# Patient Record
Sex: FEMALE | Race: BLACK OR AFRICAN AMERICAN | NOT HISPANIC OR LATINO | Employment: STUDENT | URBAN - METROPOLITAN AREA
[De-identification: names, ages, dates, MRNs, and addresses within clinical notes are randomized per-mention and may not be internally consistent; named-entity substitution may affect disease eponyms.]

---

## 2018-01-13 NOTE — PROGRESS NOTES
Assessment    1  Well adult on routine health check (V70 0) (Z00 00)    Plan  Well adult on routine health check    · Follow-up visit in 1 year Evaluation and Treatment  Follow-up  Status: Hold For -  Scheduling  Requested for: [id number]   · Always use a seat belt and shoulder strap when riding or driving a motor vehicle ;  Status:Complete;   Done: 12YHN2633   · Begin a limited exercise program ; Status:Complete;   Done: 98VCO6146   · Decreasing the stress in your life may help your condition improve ; Status:Complete;    Done: 27ORZ0456   · There are ways to decrease your stress and improve your sense of well-being  We  encourage you to keep active and exercise regularly  Make time to take care of yourself  and participate in activities that you enjoy  Stay connected to friends and family that can  support and comfort you  If at any time you have thoughts of harming yourself or  someone else, contact us immediately ; Status:Active; Requested VQ:[id number];    · Use a sun block product with an SPF of 15 or more ; Status:Complete;   Done:  67FRZ4080   · Use appropriate protective gear for your sport or work ; Status:Complete;   Done:  71MXP2002   · We recommend routine visits to a dentist ; Status:Complete;   Done: 58RVL6693   · When and how to use a seat belt for a child ; Status:Complete;   Done: 24PZN8808   · Call [phone] if: You find a new or different kind of lump in your breast ;  Status:Complete;   Done: 24UMK1383    Discussion/Summary    Impression:   No growth concerns  Anticipatory guidance addressed as per the history of present illness section  No vaccines needed  She is not on any medications  Information discussed with patient       Cleared for sports participation, form signed , review family history with parents, encouraged healthy diet , reg exercise, safety from injury, reassurance provided, return 1 year for physical    Possible side effects of new medications were reviewed with the patient/guardian today  The patient was counseled regarding diagnostic results, instructions for management, risk factor reductions, prognosis, patient and family education, impressions, risks and benefits of treatment options, importance of compliance with treatment  Chief Complaint  Sports Physical      History of Present Illness  HM, 12-18 years Female (Brief):   General Health: The child's health since the last visit is described as good   no illness since last visit  Dental hygiene: Good  Immunization status: Up to date  Caregiver concerns:   Caregivers deny concerns regarding nutrition  Menstrual status: The patient is menarcheal    Nutrition/Elimination:   Diet:  her current diet is diverse and healthy  No elimination issues are expressed  Sleep:  No sleep issues are reported  Behavior: The child's temperament is described as calm  No behavior issues identified  Health Risks:   Childcare/School: She is in high school  School performance has been excellent  Sports Participation Questions: (history reviewed no identifiable risks )   HPI: sports clearance physical , track and field, feels well, had participated in past, feels well no issues no recent illness injury or change in health      Review of Systems    Constitutional: No complaints of fever or chills, feels well, no tiredness, no recent weight gain or loss  Eyes: No complaints of eye pain, no discharge, no eyesight problems, eyes do not itch, no red or dry eyes  ENT: no complaints of nasal discharge, no hoarseness, no earache, no nosebleeds, no loss of hearing, no sore throat  Cardiovascular: No complaints of chest pain, no palpitations, normal heart rate, no lower extremity edema  Respiratory: No complaints of cough, no shortness of breath, no wheezing, no leg claudication  Gastrointestinal: No complaints of abdominal pain, no nausea or vomiting, no constipation, no diarrhea or bloody stools     Genitourinary: No complaints of incontinence, no pelvic pain, no dysuria or dysmenorrhea, no abnormal vaginal bleeding or vaginal discharge  Musculoskeletal: No complaints of limb swelling or limb pain, no myalgias, no joint swelling or joint stiffness  Integumentary: No complaints of skin rash, no skin lesions or wounds, no itching, no breast pain, no breast lump  Neurological: No complaints of headache, no numbness or tingling, no confusion, no dizziness, no limb weakness, no convulsions or fainting, no difficulty walking  Psychiatric: No complaints of feeling depressed, no suicidal thoughts, no emotional problems, no anxiety, no sleep disturbances, no change in personality  Endocrine: No complaints of feeling weak, no muscle weakness, no deepening of voice, no hot flashes or proptosis  Hematologic/Lymphatic: No complaints of swollen glands, no neck swollen glands, does not bleed or bruise easily  ROS reported by the patient  Active Problems    1  Acne (706 1) (L70 9)    Past Medical History    · History of broken finger (V15 51) (Z87 81)    Surgical History    · History of Open Treatment Of Fracture Of One Finger Joint    Family History    · Family history of Diabetes   · Family history of lung cancer (V16 1) (Z80 1)   · Family history of Heart disease    Social History    · Denied: History of Alcohol use   · Never a smoker    Current Meds   1  Vitamin A CAPS; 8000 iu Take one tab daily; Therapy: (Recorded:28Nov2014) to Recorded   2  Zinc CAPS; take 1 capsule daily; Therapy: (Recorded:28Nov2014) to Recorded    Allergies    1  No Known Drug Allergies    2   No Known Environmental Allergies    Vitals   Recorded: [id number] 01:57PM   Temperature 98 5 F   Heart Rate 58   Respiration 18   Systolic 613   Diastolic 80   Height 5 ft 4 5 in   2-20 Stature Percentile 54 %   Weight 133 lb 6 oz   2-20 Weight Percentile 66 %   BMI Calculated 22 54   BMI Percentile 64 %   BSA Calculated 1 66   O2 Saturation 99     Physical Exam    Constitutional - General appearance: No acute distress, well appearing and well nourished  Head and Face - Head and face: Normocephalic, atraumatic  Palpation of the face and sinuses: Normal, no sinus tenderness  Eyes - Conjunctiva and lids: No injection, edema or discharge  Pupils and irises: Equal, round, reactive to light bilaterally  Ears, Nose, Mouth, and Throat - External inspection of ears and nose: Normal without deformities or discharge  Otoscopic examination: Tympanic membranes gray, translucent with good bony landmarks and light reflex  Canals patent without erythema  Hearing: Normal  Nasal mucosa, septum, and turbinates: Normal, no edema or discharge  Lips, teeth, and gums: Normal, good dentition  Oropharynx: Moist mucosa, normal tongue and tonsils without lesions  Neck - Neck: Supple, symmetric, no masses  Thyroid: No thyromegaly  Pulmonary - Respiratory effort: Normal respiratory rate and rhythm, no increased work of breathing  Auscultation of lungs: Clear bilaterally  Cardiovascular - Auscultation of heart: Regular rate and rhythm, normal S1 and S2, no murmur  Carotid pulses: Normal, 2+ bilaterally  Abdominal aorta: Normal  Femoral pulses: Normal, 2+ bilaterally  Pedal pulses: Normal, 2+ bilaterally  Examination of extremities for edema and/or varicosities: Normal    Abdomen - Abdomen: Normal bowel sounds, soft, non-tender, no masses  Liver and spleen: No hepatomegaly or splenomegaly  Lymphatic - Palpation of lymph nodes in neck: No anterior or posterior cervical lymphadenopathy  Musculoskeletal - Gait and station: Normal gait  Digits and nails: Normal without clubbing or cyanosis  Inspection/palpation of joints, bones, and muscles: Normal  Evaluation for scoliosis: No scoliosis on exam  Range of motion: Normal  Stability: No joint instability   Muscle strength/tone: Normal    Skin - Skin and subcutaneous tissue: Abnormal  Clinical impression: acne (on the entire forehead, on the face and on both cheeks ) and comedones (on both cheeks )  Examination of the skin for lesions: Abnormal  Palpation of skin and subcutaneous tissue: Abnormal    Neurologic - Cranial nerves: Normal  Reflexes: Normal  Sensation: Normal  Coordination: Normal    Psychiatric - judgment and insight: Normal  Orientation to person, place, and time: Normal  Mood and affect: Normal       Procedure    Procedure:   Results: 20/13 in both eyes without corrective device, 20/13 in the right eye without corrective device, 20/13 in the left eye without corrective device      Signatures   Electronically signed by : SUNIL Angeles; Jan 17 2016  8:26PM EST                       (Author)    Electronically signed by :  Micah Esquivel DO; Jan 24 2016  8:01PM EST

## 2018-01-15 NOTE — RESULT NOTES
Verified Results  [facility]) Measles/Mumps/Rubella Immunity [id number] 08:31AM Eva Hayden     Test Name Result Flag Reference   Rubella Antibodies, IgG 2 23 index  Immune >0 99   Non-immune       <0 90                                                 Equivocal  0 90 - 0 99                                                 Immune           >0 99   Rubeola Ab, IgG 113 0 AU/mL  Immune >29 9   Negative        <25 0                                                  Equivocal 25 0 - 29 9                                                  Positive        >29 9                 Presence of antibodies to Rubeola is presumptive evidence                 of immunity except when acute infection is suspected  Mumps Abs, IgG 130 0 AU/mL  Immune >10 9   Negative         <9 0                                                 Equivocal  9 0 - 10 9                                                 Positive        >10 9                 A positive result generally indicates past exposure to                 Mumps virus or previous vaccination  ([facility]) Hepatitis B Virus (Profile VI) [id number] 08:31AM Eva Hayden     Test Name Result Flag Reference   HBsAg Screen Negative  Negative   Hep Be Ag Negative  Negative   Hep B Core Ab, IgM Negative  Negative   Hep B Core Ab, Tot Negative  Negative   Hep Be Ab Negative  Negative   Hep B Surface Ab, Qual Reactive     Non Reactive: Inconsistent with immunity,                                             less than 10 mIU/mL                               Reactive:     Consistent with immunity,                                             greater than 9 9 mIU/mL       Discussion/Summary   Your blood work is consistent with immunity to Onovative, mumps, rubella and hepatitis B  Follow up in the office as needed    RL

## 2020-06-08 ENCOUNTER — HOSPITAL ENCOUNTER (EMERGENCY)
Facility: HOSPITAL | Age: 20
End: 2020-06-10
Attending: EMERGENCY MEDICINE | Admitting: EMERGENCY MEDICINE
Payer: COMMERCIAL

## 2020-06-08 DIAGNOSIS — F31.13 BIPOLAR DISORDER WITH SEVERE MANIA (HCC): ICD-10-CM

## 2020-06-08 DIAGNOSIS — F23 ACUTE PSYCHOSIS (HCC): Primary | ICD-10-CM

## 2020-06-08 LAB
ALBUMIN SERPL BCP-MCNC: 4.6 G/DL (ref 3.5–5)
ALP SERPL-CCNC: 51 U/L (ref 46–116)
ALT SERPL W P-5'-P-CCNC: 19 U/L (ref 12–78)
ANION GAP SERPL CALCULATED.3IONS-SCNC: 11 MMOL/L (ref 4–13)
AST SERPL W P-5'-P-CCNC: 18 U/L (ref 5–45)
BASOPHILS # BLD MANUAL: 0 THOUSAND/UL (ref 0–0.1)
BASOPHILS NFR MAR MANUAL: 0 % (ref 0–1)
BILIRUB SERPL-MCNC: 1 MG/DL (ref 0.2–1)
BUN SERPL-MCNC: 15 MG/DL (ref 5–25)
CALCIUM SERPL-MCNC: 9.5 MG/DL (ref 8.3–10.1)
CHLORIDE SERPL-SCNC: 101 MMOL/L (ref 100–108)
CO2 SERPL-SCNC: 24 MMOL/L (ref 21–32)
CREAT SERPL-MCNC: 1.05 MG/DL (ref 0.6–1.3)
EOSINOPHIL # BLD MANUAL: 0 THOUSAND/UL (ref 0–0.4)
EOSINOPHIL NFR BLD MANUAL: 0 % (ref 0–6)
ERYTHROCYTE [DISTWIDTH] IN BLOOD BY AUTOMATED COUNT: 13.9 % (ref 11.6–15.1)
ETHANOL SERPL-MCNC: <3 MG/DL (ref 0–3)
GFR SERPL CREATININE-BSD FRML MDRD: 87 ML/MIN/1.73SQ M
GLUCOSE SERPL-MCNC: 122 MG/DL (ref 65–140)
HCT VFR BLD AUTO: 38.8 % (ref 34.8–46.1)
HGB BLD-MCNC: 14 G/DL (ref 11.5–15.4)
LYMPHOCYTES # BLD AUTO: 4.04 THOUSAND/UL (ref 0.6–4.47)
LYMPHOCYTES # BLD AUTO: 47 % (ref 14–44)
MCH RBC QN AUTO: 27.6 PG (ref 26.8–34.3)
MCHC RBC AUTO-ENTMCNC: 36.1 G/DL (ref 31.4–37.4)
MCV RBC AUTO: 76 FL (ref 82–98)
MONOCYTES # BLD AUTO: 1.03 THOUSAND/UL (ref 0–1.22)
MONOCYTES NFR BLD: 12 % (ref 4–12)
NEUTROPHILS # BLD MANUAL: 3.52 THOUSAND/UL (ref 1.85–7.62)
NEUTS SEG NFR BLD AUTO: 41 % (ref 43–75)
PLATELET # BLD AUTO: 281 THOUSANDS/UL (ref 149–390)
PLATELET BLD QL SMEAR: ADEQUATE
PMV BLD AUTO: 9.9 FL (ref 8.9–12.7)
POTASSIUM SERPL-SCNC: 3.3 MMOL/L (ref 3.5–5.3)
PROT SERPL-MCNC: 8.8 G/DL (ref 6.4–8.2)
RBC # BLD AUTO: 5.08 MILLION/UL (ref 3.81–5.12)
RBC MORPH BLD: NORMAL
SODIUM SERPL-SCNC: 136 MMOL/L (ref 136–145)
TOTAL CELLS COUNTED SPEC: 100
WBC # BLD AUTO: 8.59 THOUSAND/UL (ref 4.31–10.16)

## 2020-06-08 PROCEDURE — 85007 BL SMEAR W/DIFF WBC COUNT: CPT | Performed by: EMERGENCY MEDICINE

## 2020-06-08 PROCEDURE — 85027 COMPLETE CBC AUTOMATED: CPT | Performed by: EMERGENCY MEDICINE

## 2020-06-08 PROCEDURE — 96372 THER/PROPH/DIAG INJ SC/IM: CPT

## 2020-06-08 PROCEDURE — 99285 EMERGENCY DEPT VISIT HI MDM: CPT

## 2020-06-08 PROCEDURE — 99285 EMERGENCY DEPT VISIT HI MDM: CPT | Performed by: EMERGENCY MEDICINE

## 2020-06-08 PROCEDURE — 80320 DRUG SCREEN QUANTALCOHOLS: CPT | Performed by: EMERGENCY MEDICINE

## 2020-06-08 PROCEDURE — 81025 URINE PREGNANCY TEST: CPT | Performed by: EMERGENCY MEDICINE

## 2020-06-08 PROCEDURE — 80053 COMPREHEN METABOLIC PANEL: CPT | Performed by: EMERGENCY MEDICINE

## 2020-06-08 PROCEDURE — 36415 COLL VENOUS BLD VENIPUNCTURE: CPT | Performed by: EMERGENCY MEDICINE

## 2020-06-08 RX ORDER — LORAZEPAM 2 MG/ML
1 INJECTION INTRAMUSCULAR ONCE
Status: COMPLETED | OUTPATIENT
Start: 2020-06-08 | End: 2020-06-08

## 2020-06-08 RX ADMIN — LORAZEPAM 1 MG: 2 INJECTION INTRAMUSCULAR; INTRAVENOUS at 23:36

## 2020-06-09 LAB
AMPHETAMINES SERPL QL SCN: NEGATIVE
BACTERIA UR QL AUTO: ABNORMAL /HPF
BARBITURATES UR QL: NEGATIVE
BENZODIAZ UR QL: NEGATIVE
BILIRUB UR QL STRIP: NEGATIVE
CLARITY UR: ABNORMAL
COCAINE UR QL: NEGATIVE
COLOR UR: YELLOW
EXT PREG TEST URINE: NEGATIVE
EXT. CONTROL ED NAV: NORMAL
GLUCOSE UR STRIP-MCNC: NEGATIVE MG/DL
HGB UR QL STRIP.AUTO: NEGATIVE
KETONES UR STRIP-MCNC: ABNORMAL MG/DL
LEUKOCYTE ESTERASE UR QL STRIP: NEGATIVE
METHADONE UR QL: NEGATIVE
NITRITE UR QL STRIP: NEGATIVE
NON-SQ EPI CELLS URNS QL MICRO: ABNORMAL /HPF
OPIATES UR QL SCN: NEGATIVE
PCP UR QL: NEGATIVE
PH UR STRIP.AUTO: 5.5 [PH]
PROT UR STRIP-MCNC: ABNORMAL MG/DL
RBC #/AREA URNS AUTO: ABNORMAL /HPF
SP GR UR STRIP.AUTO: >=1.03 (ref 1–1.03)
THC UR QL: POSITIVE
UROBILINOGEN UR QL STRIP.AUTO: 0.2 E.U./DL
WBC #/AREA URNS AUTO: ABNORMAL /HPF

## 2020-06-09 PROCEDURE — 81001 URINALYSIS AUTO W/SCOPE: CPT | Performed by: EMERGENCY MEDICINE

## 2020-06-09 PROCEDURE — 96372 THER/PROPH/DIAG INJ SC/IM: CPT

## 2020-06-09 PROCEDURE — 80307 DRUG TEST PRSMV CHEM ANLYZR: CPT | Performed by: EMERGENCY MEDICINE

## 2020-06-09 RX ORDER — LORAZEPAM 2 MG/ML
1 INJECTION INTRAMUSCULAR ONCE
Status: COMPLETED | OUTPATIENT
Start: 2020-06-09 | End: 2020-06-09

## 2020-06-09 RX ORDER — OLANZAPINE 10 MG/1
10 INJECTION, POWDER, LYOPHILIZED, FOR SOLUTION INTRAMUSCULAR ONCE
Status: COMPLETED | OUTPATIENT
Start: 2020-06-09 | End: 2020-06-09

## 2020-06-09 RX ADMIN — OLANZAPINE 10 MG: 10 INJECTION, POWDER, FOR SOLUTION INTRAMUSCULAR at 07:36

## 2020-06-09 RX ADMIN — LORAZEPAM 1 MG: 2 INJECTION INTRAMUSCULAR; INTRAVENOUS at 07:36

## 2020-06-10 ENCOUNTER — APPOINTMENT (EMERGENCY)
Dept: RADIOLOGY | Facility: HOSPITAL | Age: 20
End: 2020-06-10
Payer: COMMERCIAL

## 2020-06-10 VITALS
OXYGEN SATURATION: 97 % | DIASTOLIC BLOOD PRESSURE: 99 MMHG | TEMPERATURE: 98.5 F | RESPIRATION RATE: 19 BRPM | HEART RATE: 100 BPM | SYSTOLIC BLOOD PRESSURE: 130 MMHG

## 2020-06-10 LAB — SARS-COV-2 RNA RESP QL NAA+PROBE: NEGATIVE

## 2020-06-10 PROCEDURE — 71045 X-RAY EXAM CHEST 1 VIEW: CPT

## 2020-06-10 PROCEDURE — 87635 SARS-COV-2 COVID-19 AMP PRB: CPT | Performed by: EMERGENCY MEDICINE

## 2020-06-10 PROCEDURE — 93005 ELECTROCARDIOGRAM TRACING: CPT

## 2020-06-11 LAB
ATRIAL RATE: 69 BPM
P AXIS: 71 DEGREES
PR INTERVAL: 154 MS
QRS AXIS: 87 DEGREES
QRSD INTERVAL: 82 MS
QT INTERVAL: 372 MS
QTC INTERVAL: 398 MS
T WAVE AXIS: 47 DEGREES
VENTRICULAR RATE: 69 BPM

## 2020-06-11 PROCEDURE — 93010 ELECTROCARDIOGRAM REPORT: CPT | Performed by: INTERNAL MEDICINE

## 2020-08-07 ENCOUNTER — HOSPITAL ENCOUNTER (INPATIENT)
Facility: HOSPITAL | Age: 20
LOS: 4 days | Discharge: LEFT BEFORE DISCHARGE | DRG: 558 | End: 2020-08-14
Attending: EMERGENCY MEDICINE | Admitting: INTERNAL MEDICINE
Payer: COMMERCIAL

## 2020-08-07 DIAGNOSIS — M62.82 RHABDOMYOLYSIS: Primary | ICD-10-CM

## 2020-08-07 DIAGNOSIS — F23 ACUTE PSYCHOSIS (HCC): ICD-10-CM

## 2020-08-07 DIAGNOSIS — F25.9 SCHIZOAFFECTIVE DISORDER, UNSPECIFIED TYPE (HCC): ICD-10-CM

## 2020-08-07 LAB
ALBUMIN SERPL BCP-MCNC: 3.8 G/DL (ref 3.5–5)
ALBUMIN SERPL BCP-MCNC: 3.9 G/DL (ref 3.5–5)
ALP SERPL-CCNC: 44 U/L (ref 46–116)
ALP SERPL-CCNC: 46 U/L (ref 46–116)
ALT SERPL W P-5'-P-CCNC: 21 U/L (ref 12–78)
ALT SERPL W P-5'-P-CCNC: 26 U/L (ref 12–78)
ANION GAP SERPL CALCULATED.3IONS-SCNC: 17 MMOL/L (ref 4–13)
ANION GAP SERPL CALCULATED.3IONS-SCNC: 8 MMOL/L (ref 4–13)
APAP SERPL-MCNC: <2 UG/ML (ref 10–20)
AST SERPL W P-5'-P-CCNC: 21 U/L (ref 5–45)
AST SERPL W P-5'-P-CCNC: 30 U/L (ref 5–45)
ATRIAL RATE: 84 BPM
ATRIAL RATE: 89 BPM
BASOPHILS # BLD AUTO: 0.05 THOUSANDS/ΜL (ref 0–0.1)
BASOPHILS NFR BLD AUTO: 1 % (ref 0–1)
BILIRUB SERPL-MCNC: 0.6 MG/DL (ref 0.2–1)
BILIRUB SERPL-MCNC: 0.8 MG/DL (ref 0.2–1)
BUN SERPL-MCNC: 10 MG/DL (ref 5–25)
BUN SERPL-MCNC: 10 MG/DL (ref 5–25)
CALCIUM SERPL-MCNC: 8.8 MG/DL (ref 8.3–10.1)
CALCIUM SERPL-MCNC: 9.2 MG/DL (ref 8.3–10.1)
CHLORIDE SERPL-SCNC: 102 MMOL/L (ref 100–108)
CHLORIDE SERPL-SCNC: 105 MMOL/L (ref 100–108)
CK MB SERPL-MCNC: 0.9 NG/ML (ref 0–5)
CK MB SERPL-MCNC: <1 % (ref 0–2.5)
CK SERPL-CCNC: 307 U/L (ref 26–192)
CO2 SERPL-SCNC: 19 MMOL/L (ref 21–32)
CO2 SERPL-SCNC: 27 MMOL/L (ref 21–32)
CREAT SERPL-MCNC: 0.89 MG/DL (ref 0.6–1.3)
CREAT SERPL-MCNC: 1.36 MG/DL (ref 0.6–1.3)
EOSINOPHIL # BLD AUTO: 0 THOUSAND/ΜL (ref 0–0.61)
EOSINOPHIL NFR BLD AUTO: 0 % (ref 0–6)
ERYTHROCYTE [DISTWIDTH] IN BLOOD BY AUTOMATED COUNT: 13.5 % (ref 11.6–15.1)
ETHANOL SERPL-MCNC: <3 MG/DL (ref 0–3)
GFR SERPL CREATININE-BSD FRML MDRD: 106 ML/MIN/1.73SQ M
GFR SERPL CREATININE-BSD FRML MDRD: 64 ML/MIN/1.73SQ M
GLUCOSE SERPL-MCNC: 170 MG/DL (ref 65–140)
GLUCOSE SERPL-MCNC: 89 MG/DL (ref 65–140)
HCT VFR BLD AUTO: 36 % (ref 34.8–46.1)
HGB BLD-MCNC: 12.7 G/DL (ref 11.5–15.4)
IMM GRANULOCYTES # BLD AUTO: 0.04 THOUSAND/UL (ref 0–0.2)
IMM GRANULOCYTES NFR BLD AUTO: 0 % (ref 0–2)
LYMPHOCYTES # BLD AUTO: 0.83 THOUSANDS/ΜL (ref 0.6–4.47)
LYMPHOCYTES NFR BLD AUTO: 8 % (ref 14–44)
MCH RBC QN AUTO: 28.1 PG (ref 26.8–34.3)
MCHC RBC AUTO-ENTMCNC: 35.3 G/DL (ref 31.4–37.4)
MCV RBC AUTO: 80 FL (ref 82–98)
MONOCYTES # BLD AUTO: 0.61 THOUSAND/ΜL (ref 0.17–1.22)
MONOCYTES NFR BLD AUTO: 6 % (ref 4–12)
NEUTROPHILS # BLD AUTO: 9.12 THOUSANDS/ΜL (ref 1.85–7.62)
NEUTS SEG NFR BLD AUTO: 85 % (ref 43–75)
NRBC BLD AUTO-RTO: 0 /100 WBCS
P AXIS: 110 DEGREES
P AXIS: 78 DEGREES
PLATELET # BLD AUTO: 277 THOUSANDS/UL (ref 149–390)
PMV BLD AUTO: 10.1 FL (ref 8.9–12.7)
POTASSIUM SERPL-SCNC: 3.5 MMOL/L (ref 3.5–5.3)
POTASSIUM SERPL-SCNC: 4 MMOL/L (ref 3.5–5.3)
PR INTERVAL: 176 MS
PR INTERVAL: 178 MS
PROT SERPL-MCNC: 7.4 G/DL (ref 6.4–8.2)
PROT SERPL-MCNC: 7.9 G/DL (ref 6.4–8.2)
QRS AXIS: 104 DEGREES
QRS AXIS: 84 DEGREES
QRSD INTERVAL: 80 MS
QRSD INTERVAL: 80 MS
QT INTERVAL: 352 MS
QT INTERVAL: 356 MS
QTC INTERVAL: 420 MS
QTC INTERVAL: 428 MS
RBC # BLD AUTO: 4.52 MILLION/UL (ref 3.81–5.12)
SALICYLATES SERPL-MCNC: <3 MG/DL (ref 3–20)
SARS-COV-2 RNA RESP QL NAA+PROBE: NEGATIVE
SODIUM SERPL-SCNC: 138 MMOL/L (ref 136–145)
SODIUM SERPL-SCNC: 140 MMOL/L (ref 136–145)
T WAVE AXIS: 121 DEGREES
T WAVE AXIS: 57 DEGREES
VENTRICULAR RATE: 84 BPM
VENTRICULAR RATE: 89 BPM
WBC # BLD AUTO: 10.65 THOUSAND/UL (ref 4.31–10.16)

## 2020-08-07 PROCEDURE — 96372 THER/PROPH/DIAG INJ SC/IM: CPT

## 2020-08-07 PROCEDURE — 87635 SARS-COV-2 COVID-19 AMP PRB: CPT | Performed by: EMERGENCY MEDICINE

## 2020-08-07 PROCEDURE — 80329 ANALGESICS NON-OPIOID 1 OR 2: CPT | Performed by: EMERGENCY MEDICINE

## 2020-08-07 PROCEDURE — 85025 COMPLETE CBC W/AUTO DIFF WBC: CPT | Performed by: EMERGENCY MEDICINE

## 2020-08-07 PROCEDURE — 93005 ELECTROCARDIOGRAM TRACING: CPT

## 2020-08-07 PROCEDURE — 36415 COLL VENOUS BLD VENIPUNCTURE: CPT | Performed by: EMERGENCY MEDICINE

## 2020-08-07 PROCEDURE — 81025 URINE PREGNANCY TEST: CPT | Performed by: EMERGENCY MEDICINE

## 2020-08-07 PROCEDURE — 80053 COMPREHEN METABOLIC PANEL: CPT | Performed by: EMERGENCY MEDICINE

## 2020-08-07 PROCEDURE — 99285 EMERGENCY DEPT VISIT HI MDM: CPT

## 2020-08-07 PROCEDURE — 82553 CREATINE MB FRACTION: CPT | Performed by: EMERGENCY MEDICINE

## 2020-08-07 PROCEDURE — 82550 ASSAY OF CK (CPK): CPT | Performed by: EMERGENCY MEDICINE

## 2020-08-07 PROCEDURE — 99285 EMERGENCY DEPT VISIT HI MDM: CPT | Performed by: EMERGENCY MEDICINE

## 2020-08-07 PROCEDURE — 93010 ELECTROCARDIOGRAM REPORT: CPT | Performed by: INTERNAL MEDICINE

## 2020-08-07 PROCEDURE — 80320 DRUG SCREEN QUANTALCOHOLS: CPT | Performed by: EMERGENCY MEDICINE

## 2020-08-07 RX ORDER — LORAZEPAM 2 MG/ML
2 INJECTION INTRAMUSCULAR ONCE
Status: COMPLETED | OUTPATIENT
Start: 2020-08-07 | End: 2020-08-07

## 2020-08-07 RX ORDER — DIPHENHYDRAMINE HYDROCHLORIDE 50 MG/ML
50 INJECTION INTRAMUSCULAR; INTRAVENOUS ONCE
Status: COMPLETED | OUTPATIENT
Start: 2020-08-07 | End: 2020-08-07

## 2020-08-07 RX ORDER — OLANZAPINE 10 MG/1
10 INJECTION, POWDER, LYOPHILIZED, FOR SOLUTION INTRAMUSCULAR ONCE
Status: COMPLETED | OUTPATIENT
Start: 2020-08-07 | End: 2020-08-07

## 2020-08-07 RX ADMIN — OLANZAPINE 10 MG: 10 INJECTION, POWDER, FOR SOLUTION INTRAMUSCULAR at 11:16

## 2020-08-07 RX ADMIN — DIPHENHYDRAMINE HYDROCHLORIDE 50 MG: 50 INJECTION, SOLUTION INTRAMUSCULAR; INTRAVENOUS at 11:13

## 2020-08-07 RX ADMIN — LORAZEPAM 2 MG: 2 INJECTION INTRAMUSCULAR; INTRAVENOUS at 11:14

## 2020-08-07 NOTE — ED NOTES
Pt arrives to ER screaming and combative, carried in by four police officers and immediately restrained to bed and medicated as per order       Lo Winkler RN  08/07/20 6634

## 2020-08-07 NOTE — ED PROVIDER NOTES
History  Chief Complaint   Patient presents with    Psychiatric Evaluation     Patient arrived via police for crisis     Patient brought in by police for psychiatric evaluation  Patient has a history of schizoaffective disorder and as per father has not been taking her medication  Family guidance did mobile to try voluntary admission for last 2 hours but were unable to  Patient carried into the ER by 4 police officers  Unable to get any history from the patient  Patient placed in 4 point locked restraints and medicated  History provided by:  Parent, police and medical records  History limited by:  Psychiatric disorder   used: No    Psychiatric Evaluation       None       Past Medical History:   Diagnosis Date    Murmur, cardiac     Psychiatric disorder        History reviewed  No pertinent surgical history  History reviewed  No pertinent family history  I have reviewed and agree with the history as documented  E-Cigarette/Vaping    E-Cigarette Use Never User      E-Cigarette/Vaping Substances     Social History     Tobacco Use    Smoking status: Current Some Day Smoker    Smokeless tobacco: Never Used   Substance Use Topics    Alcohol use: Yes     Comment: states drank alot tonight    Drug use: Not on file     Comment: sometimes uses mushrooms       Review of Systems   Unable to perform ROS: Psychiatric disorder       Physical Exam  Physical Exam  Vitals signs and nursing note reviewed  Constitutional:       Comments: Agitated and fighting with police   HENT:      Head: Atraumatic  Eyes:      Pupils: Pupils are equal, round, and reactive to light  Neck:      Musculoskeletal: Normal range of motion  Cardiovascular:      Rate and Rhythm: Regular rhythm  Tachycardia present  Pulses: Normal pulses  Pulmonary:      Effort: Pulmonary effort is normal  No respiratory distress  Breath sounds: Normal breath sounds  No wheezing or rhonchi     Abdominal:      General: Abdomen is flat  Bowel sounds are normal  There is no distension  Tenderness: There is no abdominal tenderness  There is no guarding  Musculoskeletal: Normal range of motion  Skin:     Capillary Refill: Capillary refill takes less than 2 seconds  Neurological:      General: No focal deficit present  Mental Status: She is alert  Psychiatric:      Comments: agitated         Vital Signs  ED Triage Vitals   Temp Pulse Resp BP SpO2   -- -- -- -- --      Temp src Heart Rate Source Patient Position - Orthostatic VS BP Location FiO2 (%)   -- -- -- -- --      Pain Score       --           There were no vitals filed for this visit  Visual Acuity      ED Medications  Medications   OLANZapine (ZyPREXA) IM injection 10 mg (10 mg Intramuscular Given 8/7/20 1116)   diphenhydrAMINE (BENADRYL) injection 50 mg (50 mg Intramuscular Given 8/7/20 1113)   LORazepam (ATIVAN) injection 2 mg (2 mg Intramuscular Given 8/7/20 1114)       Diagnostic Studies  Results Reviewed     Procedure Component Value Units Date/Time    CK Total with Reflex CKMB [[id number]] Collected:  08/07/20 1149    Lab Status: In process Specimen:  Blood from Arm, Right Updated:  08/07/20 1340    [facility]ov[facility]-19[facility] [[id number]]  (Normal) Collected:  08/07/20 1151    Lab Status:  Final result Specimen:  Nares from Nose Updated:  08/07/20 1251     SARS-CoV-2 Negative    Narrative: The specimen collection materials, transport medium, and/or testing methodology utilized in the production of these test results have been proven to be reliable in a limited validation with an abbreviated program under the Emergency Utilization Authorization provided by the FDA  Testing reported as "Presumptive positive" will be confirmed with secondary testing with a reference laboratory to ensure result accuracy    Clinical caution and judgement should be used with the interpretation of these results with consideration of the clinical impression and other laboratory testing  Testing reported as "Positive" or "Negative" has been proven to be accurate according to standard laboratory validation requirements  All testing is performed with control materials showing appropriate reactivity at standard intervals        Ethanol [[id number]]  (Normal) Collected:  08/07/20 1149    Lab Status:  Final result Specimen:  Blood from Arm, Right Updated:  08/07/20 1216     Ethanol Lvl <3 mg/dL     Comprehensive metabolic panel [[id number]]  (Abnormal) Collected:  08/07/20 1149    Lab Status:  Final result Specimen:  Blood from Arm, Right Updated:  08/07/20 1212     Sodium 138 mmol/L      Potassium 3 5 mmol/L      Chloride 102 mmol/L      CO2 19 mmol/L      ANION GAP 17 mmol/L      BUN 10 mg/dL      Creatinine 1 36 mg/dL      Glucose 170 mg/dL      Calcium 9 2 mg/dL      AST 21 U/L      ALT 21 U/L      Alkaline Phosphatase 46 U/L      Total Protein 7 9 g/dL      Albumin 3 9 g/dL      Total Bilirubin 0 60 mg/dL      eGFR 64 ml/min/1 73sq m     Narrative:       [place] guidelines for Chronic Kidney Disease (CKD):     Stage 1 with normal or high GFR (GFR > 90 mL/min/1 73 square meters)    Stage 2 Mild CKD (GFR = 60-89 mL/min/1 73 square meters)    Stage 3A Moderate CKD (GFR = 45-59 mL/min/1 73 square meters)    Stage 3B Moderate CKD (GFR = 30-44 mL/min/1 73 square meters)    Stage 4 Severe CKD (GFR = 15-29 mL/min/1 73 square meters)    Stage 5 End Stage CKD (GFR <15 mL/min/1 73 square meters)  Note: GFR calculation is accurate only with a steady state creatinine    CBC and differential [[id number]]  (Abnormal) Collected:  08/07/20 1149    Lab Status:  Final result Specimen:  Blood from Arm, Right Updated:  08/07/20 1158     WBC 10 65 Thousand/uL      RBC 4 52 Million/uL      Hemoglobin 12 7 g/dL      Hematocrit 36 0 %      MCV 80 fL      MCH 28 1 pg      MCHC 35 3 g/dL      RDW 13 5 %      MPV 10 1 fL      Platelets 604 Thousands/uL      nRBC 0 /100 WBCs      Neutrophils Relative 85 %      Immat GRANS % 0 %      Lymphocytes Relative 8 %      Monocytes Relative 6 %      Eosinophils Relative 0 %      Basophils Relative 1 %      Neutrophils Absolute 9 12 Thousands/µL      Immature Grans Absolute 0 04 Thousand/uL      Lymphocytes Absolute 0 83 Thousands/µL      Monocytes Absolute 0 61 Thousand/µL      Eosinophils Absolute 0 00 Thousand/µL      Basophils Absolute 0 05 Thousands/µL     UA (URINE) with reflex to Scope [[id number]]     Lab Status:  No result Specimen:  Urine     Rapid drug screen, urine [[id number]]     Lab Status:  No result Specimen:  Urine     POCT pregnancy, urine [[id number]]     Lab Status:  No result                  No orders to display              Procedures  Procedures         ED Course       US AUDIT      Most Recent Value   Initial Alcohol Screen: US AUDIT-C    1  How often do you have a drink containing alcohol?  0 Filed at: 08/07/2020 1112   2  How many drinks containing alcohol do you have on a typical day you are drinking? 0 Filed at: 08/07/2020 1112   3a  Male UNDER 65: How often do you have five or more drinks on one occasion? 0 Filed at: 08/07/2020 1112   3b  FEMALE Any Age, or MALE 65+: How often do you have 4 or more drinks on one occassion? 0 Filed at: 08/07/2020 1112   Audit-C Score  0 Filed at: 08/07/2020 1112                  YI/DAST-10      Most Recent Value   How many times in the past year have you    Used an illegal drug or used a prescription medication for non-medical reasons? Never Filed at: 08/07/2020 1112                                MDM  Number of Diagnoses or Management Options  Acute psychosis [facility]):   Rhabdomyolysis:   Diagnosis management comments: Pulse ox 100% on RA indicating adequate oxygenation      Patient signed out to next provider Dr Ayse Cody pending re-evalution          Amount and/or Complexity of Data Reviewed  Clinical lab tests: ordered and reviewed  Decide to obtain previous medical records or to obtain history from someone other than the patient: yes  Review and summarize past medical records: yes    Patient Progress  Patient progress: stable        Disposition  Final diagnoses:   None     ED Disposition     None      Follow-up Information    None         Patient's Medications    No medications on file     No discharge procedures on file      PDMP Review     None          ED Provider  Electronically Signed by           Danitza Reyes DO  08/11/20 9289

## 2020-08-07 NOTE — ED PROCEDURE NOTE
PROCEDURE  ECG 12 Lead Documentation Only    Date/Time: 8/7/2020 12:05 PM  Performed by: Justin Drake DO  Authorized by: Justin Drake DO     ECG reviewed by me, the ED Provider: yes    Patient location:  ED  Interpretation:     Interpretation: normal    Rate:     ECG rate:  84    ECG rate assessment: normal    Rhythm:     Rhythm: sinus rhythm    Ectopy:     Ectopy: none    Conduction:     Conduction: normal    ST segments:     ST segments:  Normal  T waves:     T waves: normal           Justin Drake DO  08/07/20 1205

## 2020-08-07 NOTE — ED NOTES
8/7/20 @ 1056:  PES received call from Brea Bhand[facility]5 [facility], who reports that she is at patient's house (PES heard patient screaming in the background), after parents called for a mobile outreach due to increased paranoia  Tana Hartley states that patient is screaming, yelling, and appears to be responding to internal stimuli  Police are on site and attempting to place patient in patrol car  Once this is done, patient will be transported to ED  Patient didn't make any suicidal or homicidal statements  Michelle Fitch Richard 87     4421:  Police brought in 23year-old female, who was physically and verbally aggressive  Police and security had to carry patient to room 2 and she required physical and medication restraints  Within 15 minutes, patient was secured and sleeping  Patient kept saying, "I'm sorry; I lied about things," but wouldn't or couldn't elaborate  Staff was considerate of patient's privacy  Patient was moved to secured holding room 20  PES met with patient's father and provided process, and that ED staff would let patient sleep, and PES will assess when patient is awake and alert  Sahil Méndez, [address]: PES updated Tana Hartley from [facility]  [place] ,[place]    1410: PES checked on patient, but she was still sleeping    Sahil Méndez [place]

## 2020-08-07 NOTE — ED NOTES
Blood drawn, pt remains sedated, no distress noted, respirations easy and unlabored       Capo Hawthorne RN  08/07/20 6987

## 2020-08-07 NOTE — ED NOTES
Not answering any questions or following commands  Pt continues to scream "I lied"        Maureen Castillo, RN  08/07/20 6944

## 2020-08-07 NOTE — ED NOTES
Pt sleeping when observed, remains on constant observation     Raheem Vasques, JOSEFINA  08/07/20 1913

## 2020-08-07 NOTE — ED NOTES
19:30 - patient has remained asleep the entire shift thus far  Sangeetha Pereyra / Eric Kohler called for an update about 19:20  The patient will need to be assessed on 8/8/20 as she slept the entire PES shift this evening

## 2020-08-07 NOTE — ED NOTES
Dinner tray ordered, patient continues to sleep  Remains on observation        Daylin Huynh  08/07/20 8287

## 2020-08-07 NOTE — ED NOTES
Pt transferred to Room 20  Changed into psych attire, restraints removed, observation maintained       Gabriel Noble RN  08/07/20 3595

## 2020-08-08 LAB
AMPHETAMINES SERPL QL SCN: NEGATIVE
BACTERIA UR QL AUTO: ABNORMAL /HPF
BARBITURATES UR QL: NEGATIVE
BENZODIAZ UR QL: NEGATIVE
BILIRUB UR QL STRIP: NEGATIVE
CLARITY UR: ABNORMAL
COCAINE UR QL: NEGATIVE
COLOR UR: YELLOW
EXT PREG TEST URINE: NEGATIVE
EXT. CONTROL ED NAV: NORMAL
GLUCOSE UR STRIP-MCNC: NEGATIVE MG/DL
HGB UR QL STRIP.AUTO: ABNORMAL
KETONES UR STRIP-MCNC: ABNORMAL MG/DL
LEUKOCYTE ESTERASE UR QL STRIP: NEGATIVE
METHADONE UR QL: NEGATIVE
NITRITE UR QL STRIP: NEGATIVE
NON-SQ EPI CELLS URNS QL MICRO: ABNORMAL /HPF
OPIATES UR QL SCN: NEGATIVE
OXYCODONE+OXYMORPHONE UR QL SCN: NEGATIVE
PCP UR QL: NEGATIVE
PH UR STRIP.AUTO: 6 [PH]
PROT UR STRIP-MCNC: NEGATIVE MG/DL
RBC #/AREA URNS AUTO: ABNORMAL /HPF
SP GR UR STRIP.AUTO: <=1.005 (ref 1–1.03)
THC UR QL: POSITIVE
UROBILINOGEN UR QL STRIP.AUTO: 0.2 E.U./DL
WBC #/AREA URNS AUTO: ABNORMAL /HPF

## 2020-08-08 PROCEDURE — 80307 DRUG TEST PRSMV CHEM ANLYZR: CPT | Performed by: EMERGENCY MEDICINE

## 2020-08-08 PROCEDURE — 81001 URINALYSIS AUTO W/SCOPE: CPT | Performed by: EMERGENCY MEDICINE

## 2020-08-08 RX ORDER — LORAZEPAM 1 MG/1
1 TABLET ORAL ONCE
Status: DISCONTINUED | OUTPATIENT
Start: 2020-08-08 | End: 2020-08-10

## 2020-08-08 RX ORDER — HYDROXYZINE HYDROCHLORIDE 25 MG/1
50 TABLET, FILM COATED ORAL ONCE
Status: COMPLETED | OUTPATIENT
Start: 2020-08-08 | End: 2020-08-09

## 2020-08-08 NOTE — ED NOTES
Karolyn Lopez from [facility] called  Dr Bandar Mitchell ready for telepsych  PES assisted pt with the telepsych      Barbara Trejo

## 2020-08-08 NOTE — ED NOTES
Pt continues to sleep, repositions self in bed, remains on constant observation     Sapphire Garcia, JOSEFINA  08/08/20 2922

## 2020-08-08 NOTE — ED NOTES
Pt woke up when vitals were obtained requested something to eat  Boxed lunch given   Pt fell back to sleep after eating, remains on constant observation     Jeevan Owens RN  08/08/20 5096

## 2020-08-08 NOTE — ED NOTES
To waiting room to get mother for visit  Mother not in waiting room  Will have mother visit upon return        Toby Mckinley RN  08/08/20 3435

## 2020-08-08 NOTE — ED NOTES
Received pt calm and cooperative at this time  Screening in progress       Kelvin Ramírez RN  08/08/20 [facility] [place], RN  08/08/20 7861

## 2020-08-08 NOTE — ED NOTES
Pt continues to sleep, repositions self in bed, remains on constant observation     Mino December, RN  08/08/20 6331

## 2020-08-08 NOTE — ED NOTES
Pt resting quietly  No distress noted  1:1 continues for pt safety         Manuel Luis RN  08/08/20 8308

## 2020-08-08 NOTE — ED NOTES
Pt sitting in chair outside of room doorway, eating lunch  Pt remains tearful  Socks given to pt at pt request for comfort  Emotional support attempted, with little result         Toby Mckinley RN  08/08/20 8775

## 2020-08-08 NOTE — ED NOTES
Pt observed walking around behavioral area, remains on constant observation     Philippe Christianson RN  08/08/20 9252

## 2020-08-08 NOTE — ED NOTES
VS completed  Pt tearful, asking for a "few minutes"  Pt cooperative, but tearful  No distress noted         Baron Arrington RN  08/08/20 3713

## 2020-08-08 NOTE — ED NOTES
Pt asked to speak with TEODORO  Pt stated she would sign in voluntarily if she could go to a hospital of her choice because "I want to find a psychiatrist and therapist that I feel comfortable with and I can talk to them " TEODORO explained how the process works and that we will try to accommodate her wishes but we can not guarantee her preferred placement  PES encouraged her to work with the treatment team wherever she goes and follow up on discharge recommendation   Pt stated she will wait and talk to family guidance psychiatrist     Frederick Palomares

## 2020-08-08 NOTE — ED NOTES
Pt requesting phone to call parents  Kenn, crisis aware, and will talk with pt shortly  Pt aware         Yecenia Valderrama RN  08/08/20 1038

## 2020-08-08 NOTE — ED NOTES
Pt continues to sleep, repositions self in bed, remains on constant observation     Sami Gallego RN  08/07/20 6547

## 2020-08-08 NOTE — ED NOTES
Pt being screened by family guidance  At this time  Kenn Verdugo aware that pt mother wishes to speak with interviewer prior to interview completed  Mother in waiting room        Jer Funk RN  08/08/20 1546 [address], RN  08/08/20 6051

## 2020-08-08 NOTE — ED NOTES
Interviewed pt who was sitting in the middle of the floor  Pt stated "I'm afraid  I'm afraid I'm going to die and something bad will happen to my family " Pt was brought into ED by police, screaming and kicking  She had to be carried in by four officers and restrained and medicated  Pt stated she went off medication a week after discharged from the hospital because "it made me feel worse " She was hospitalized at [facility] in June, 2020 for one week  She said she tried to keep self busy by reading  Pt presented at depressed, tearful, rambling, denies any suicidal or homicidal ideation, poor sleep and appetite  When asked if she agreed to voluntarily signed herself into a hospital, pt said "I want to voluntarily sign myself in to outpatient so I can talk to a psychiatrist and a therapist of my own choosing " PES explained the process and informed her that family guidance will be contacted for screening  Contact pt's parents ([phone])  Mrs Alfaro reported in the last few days pt was becoming more stressed and anxious, not able to sleep or focused, increased agitation  Pt works from home as an   Pt graduated in May, 2020 and started work full time on July 6th after the most recent discharged from the [facility]  Mother said pt was doing well until few days ago  Mother thinks pt would be best if she can be discharged home and she would want to make sure pt gets back on medication  PES explained to the parents that family guidance will be contacted for screening and they will make a determination  Called family guidance and requested a screening   Will fax chart per request     Michelle Lauren

## 2020-08-08 NOTE — ED NOTES
Pt tearful, sitting in middle of common area floor  Pt conversational with staff  Pt reports feeling anxious and unsafe  Emotinal support given  D/W pt order for ativan  Pt continues to decline offer for ativan         Ajay Arroyo RN  08/08/20 1101 NARESH Davenport RN  08/08/20 8528

## 2020-08-08 NOTE — ED NOTES
Pt ate dinner  Pt tearful otherwise pt is calm and cooperative at this time       Ernestina Romero RN  08/08/20 3936

## 2020-08-08 NOTE — ED NOTES
Pt awake, states she is feeling anxious and claustrophobic  Dr Ashlee Grimm aware, pt offered an ativan, doesn't want to take it  States "are you giving me medication and then I'll fall asleep, last time they sent me away to a [facility] hospital " Explained that she needed to wait and talk to the crisis worker in the morning around 8am, pt states "that's what they said last time, wait until 8, then 9, then they sent me away " Explained pt had a right to refuse medication, pt was afraid that refusal would be held against her "then they will say I won't take my medicine" Emotional support given, pt quiet but tearful  Reminded patient that she needed to provide a urine specimen, pt had woken up and went straight to the bathroom before she could be provided a cup  Pt verbalized understanding, water provided  Remains on constant observation        Diepsh Lovett RN  08/08/20 4053

## 2020-08-08 NOTE — ED NOTES
Pt sitting on end of bed  Pt wants to watch tv  Will find remote to access TV  Pt less tearful at this time         Francis Blunt RN  08/08/20 3155

## 2020-08-08 NOTE — ED NOTES
Pt washed up in the bathroom, returned to room and finished snacks in boxed lunch  Pt currently curled up under blanket at foot of bed   Remains on constant observation     Shayy Padilla RN  08/08/20 6370

## 2020-08-08 NOTE — ED NOTES
Mother in unit  Telepsych about to be done  Kenn from Crisis asks mother to wait for screening to be completed before visit        Heike Barros RN  08/08/20 5051

## 2020-08-08 NOTE — ED NOTES
PES assisted pt with screening  Cortney Barahona from family guidance stated she will set pt up for telepsych later, and she will contact pt's mother by phone as PES couldn't locate her in the lobby or parking lot  Pt appeared calm during the interview  Pt's mother called  Informed her that family guidance will contact her by phone      Dasia Read

## 2020-08-09 LAB
ANION GAP SERPL CALCULATED.3IONS-SCNC: 14 MMOL/L (ref 4–13)
BUN SERPL-MCNC: 9 MG/DL (ref 5–25)
CALCIUM SERPL-MCNC: 8.9 MG/DL (ref 8.3–10.1)
CHLORIDE SERPL-SCNC: 104 MMOL/L (ref 100–108)
CK MB SERPL-MCNC: 1.3 NG/ML (ref 0–5)
CK MB SERPL-MCNC: 1.5 NG/ML (ref 0–5)
CK MB SERPL-MCNC: 1.6 NG/ML (ref 0–5)
CK MB SERPL-MCNC: <1 % (ref 0–2.5)
CK SERPL-CCNC: 1965 U/L (ref 26–192)
CK SERPL-CCNC: 2073 U/L (ref 26–192)
CK SERPL-CCNC: 2081 U/L (ref 26–192)
CO2 SERPL-SCNC: 21 MMOL/L (ref 21–32)
CREAT SERPL-MCNC: 1.18 MG/DL (ref 0.6–1.3)
GFR SERPL CREATININE-BSD FRML MDRD: 76 ML/MIN/1.73SQ M
GLUCOSE SERPL-MCNC: 118 MG/DL (ref 65–140)
POTASSIUM SERPL-SCNC: 3.8 MMOL/L (ref 3.5–5.3)
SODIUM SERPL-SCNC: 139 MMOL/L (ref 136–145)

## 2020-08-09 PROCEDURE — 96361 HYDRATE IV INFUSION ADD-ON: CPT

## 2020-08-09 PROCEDURE — 82550 ASSAY OF CK (CPK): CPT | Performed by: EMERGENCY MEDICINE

## 2020-08-09 PROCEDURE — 36415 COLL VENOUS BLD VENIPUNCTURE: CPT | Performed by: EMERGENCY MEDICINE

## 2020-08-09 PROCEDURE — 80048 BASIC METABOLIC PNL TOTAL CA: CPT | Performed by: EMERGENCY MEDICINE

## 2020-08-09 PROCEDURE — 82553 CREATINE MB FRACTION: CPT | Performed by: EMERGENCY MEDICINE

## 2020-08-09 PROCEDURE — 96360 HYDRATION IV INFUSION INIT: CPT

## 2020-08-09 PROCEDURE — 96372 THER/PROPH/DIAG INJ SC/IM: CPT

## 2020-08-09 RX ORDER — MIDAZOLAM HYDROCHLORIDE 2 MG/2ML
5 INJECTION, SOLUTION INTRAMUSCULAR; INTRAVENOUS ONCE
Status: DISCONTINUED | OUTPATIENT
Start: 2020-08-09 | End: 2020-08-10

## 2020-08-09 RX ORDER — HYDROXYZINE HYDROCHLORIDE 25 MG/1
50 TABLET, FILM COATED ORAL EVERY 8 HOURS PRN
Status: DISCONTINUED | OUTPATIENT
Start: 2020-08-09 | End: 2020-08-14 | Stop reason: HOSPADM

## 2020-08-09 RX ORDER — HALOPERIDOL 5 MG
5 TABLET ORAL ONCE
Status: COMPLETED | OUTPATIENT
Start: 2020-08-09 | End: 2020-08-09

## 2020-08-09 RX ORDER — HALOPERIDOL 5 MG/ML
2.5 INJECTION INTRAMUSCULAR EVERY 6 HOURS PRN
Status: DISCONTINUED | OUTPATIENT
Start: 2020-08-09 | End: 2020-08-14 | Stop reason: HOSPADM

## 2020-08-09 RX ORDER — BENZTROPINE MESYLATE 1 MG/1
1 TABLET ORAL EVERY 6 HOURS PRN
Status: DISCONTINUED | OUTPATIENT
Start: 2020-08-09 | End: 2020-08-14 | Stop reason: HOSPADM

## 2020-08-09 RX ORDER — HALOPERIDOL 1 MG/1
2.5 TABLET ORAL EVERY 6 HOURS PRN
Status: DISCONTINUED | OUTPATIENT
Start: 2020-08-09 | End: 2020-08-14 | Stop reason: HOSPADM

## 2020-08-09 RX ORDER — OLANZAPINE 10 MG/1
10 INJECTION, POWDER, LYOPHILIZED, FOR SOLUTION INTRAMUSCULAR ONCE
Status: COMPLETED | OUTPATIENT
Start: 2020-08-09 | End: 2020-08-09

## 2020-08-09 RX ORDER — LORAZEPAM 2 MG/ML
1 INJECTION INTRAMUSCULAR ONCE
Status: COMPLETED | OUTPATIENT
Start: 2020-08-09 | End: 2020-08-09

## 2020-08-09 RX ORDER — DIPHENHYDRAMINE HYDROCHLORIDE 50 MG/ML
50 INJECTION INTRAMUSCULAR; INTRAVENOUS ONCE
Status: DISCONTINUED | OUTPATIENT
Start: 2020-08-09 | End: 2020-08-10

## 2020-08-09 RX ORDER — SODIUM CHLORIDE 9 MG/ML
250 INJECTION, SOLUTION INTRAVENOUS CONTINUOUS
Status: DISCONTINUED | OUTPATIENT
Start: 2020-08-09 | End: 2020-08-14

## 2020-08-09 RX ADMIN — OLANZAPINE 10 MG: 10 INJECTION, POWDER, FOR SOLUTION INTRAMUSCULAR at 06:53

## 2020-08-09 RX ADMIN — HYDROXYZINE HYDROCHLORIDE 25 MG: 25 TABLET, FILM COATED ORAL at 20:43

## 2020-08-09 RX ADMIN — SODIUM CHLORIDE 125 ML/HR: 0.9 INJECTION, SOLUTION INTRAVENOUS at 12:11

## 2020-08-09 RX ADMIN — HALOPERIDOL 5 MG: 5 TABLET ORAL at 06:53

## 2020-08-09 RX ADMIN — SODIUM CHLORIDE 1000 ML: 0.9 INJECTION, SOLUTION INTRAVENOUS at 09:12

## 2020-08-09 RX ADMIN — LORAZEPAM 1 MG: 2 INJECTION INTRAMUSCULAR; INTRAVENOUS at 06:54

## 2020-08-09 RX ADMIN — SODIUM CHLORIDE 200 ML/HR: 0.9 INJECTION, SOLUTION INTRAVENOUS at 18:40

## 2020-08-09 RX ADMIN — SODIUM CHLORIDE 1000 ML: 0.9 INJECTION, SOLUTION INTRAVENOUS at 09:19

## 2020-08-09 RX ADMIN — HYDROXYZINE HYDROCHLORIDE 50 MG: 25 TABLET, FILM COATED ORAL at 00:07

## 2020-08-09 NOTE — ED NOTES
Pt being medicated and restrained at this time, pt uncooperative, swinging arms and legs violently, max assist needed to hold patient down  Pt secured in four point locked limbs  Observation maintained       Emili Mcdowell RN  08/09/20 3225

## 2020-08-09 NOTE — ED NOTES
Patient asking for restraints to be removed,informed that they will be removed once the IVF's have finished  PAtient then stating the IV is hurting,IV site is normal,no swelling noted,no redness noted,patient informed that HL remains until IVF's have finished       Nubia Bradley RN  08/09/20 4959

## 2020-08-09 NOTE — ED NOTES
Assumed care of patient  Mother at the bedside  Dr Danay Yeh and this nurse spoke to patient's mother at length at the bedside  Mother updated of medical requirements in order to move forward with psychiatric hospitalization  Mother agreeable with current plan of care  Mother is currently working to secure outpatient therapies for when patient completes her hospitalization  Confirmed mother's phone number ([phone]) and will call her with repeat lab work and updated plan of care once it is resulted       Fransisca Borges RN  08/09/20 3034 [facility] Jennifer Ocampo RN  08/09/20 5493

## 2020-08-09 NOTE — ED NOTES
Family guidance called  Pt is committed  Jc Garciavictor m said the insurance office closed, unable to verify but she's sending referral to [facility]  Request EKG    Informed attending MD of request     Yisel Santiago

## 2020-08-09 NOTE — ED NOTES
Patient remains in restraints,remains on 1:1 [facility] for safety[facility]       Socoter Castellon RN  08/09/20 2419
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
## 2020-08-09 NOTE — ED NOTES
Patient requesting to call mom on the phone  Phone given to patient to make call   1:1 observation continues      Hussain Sellers  08/09/20 2288

## 2020-08-09 NOTE — ED NOTES
Able to draw blood at this time but patient remains paranoid, eye darting around room and bell snot follow commands, will maintain restraints for this time       Chelo Cedillo RN  08/09/20 0611

## 2020-08-09 NOTE — ED NOTES
Pt remains calm but needs to be moved for IVF's secondary to increased CK, [place] police called for assist, pt placed back in restraints for staff safety  Pt then moved to CT1, PIV started and IVF"S as ordered, Benadryl and Versed not given, pt remains calm and sedate in restraints       Chelo Cedillo RN  08/09/20 6192

## 2020-08-09 NOTE — ED NOTES
Pt calming but remains anxious and paranoid, restraints remain in place       Sander Arellano RN  08/09/20 8528

## 2020-08-09 NOTE — ED NOTES
Patient stated that she needed to use the restroom  Patient placed on bedpan  Patient urinated 600mL and overflowed the bedpan  Patient released from restraints and assisted with washing up and changing linens  Patient calm and cooperative at this time  Patient requesting lunch   Lunch tray ordered       Jade Fall  08/09/20 9564

## 2020-08-09 NOTE — ED NOTES
Called from pt's mother inquiring when pt will be transferred  PES informed her that we haven't heard anything from family guidance  PES advised her to call pt's nurse for an update on pt's medical condition  Phone number given to her

## 2020-08-09 NOTE — ED NOTES
PAtient receiving IVF's via gravity  Patient is in four point restraints to ensure medical care at this time  Patient remains scared and paranoid  Patient is on 1:1 observation for behavior/elopement with sitter at doorway       Sandy Hawthorne RN  08/09/20 2644

## 2020-08-09 NOTE — ED NOTES
Left and right leg restraint removed, right and left wrist restraint remain intact, IVF infusing, mother at bedside, updated on pt condition       Shana Pearson RN  08/09/20 2858

## 2020-08-09 NOTE — ED NOTES
Patient's mother called to speak with crisis worker and call was transferred to him,but then patient's mother called back to speak with nurse caring for her at this time,started asking questions of what medications had been given and why her daughter was receiving IVF's which was explained to her,and asked when the patient was leaving here,which she was informed staff does not have an answer to that 12 Cali Wang mother asked to speak with her but was informed that we do not have any mobile phones or a way to have her speak with her,then mother asked for charge nurse or supervisor,accusing nurse of having an attitude,when spoken to was stated nurse does not have an attitude and that nurse has tried to her best ability to answer her questions regarding her daughter, but to also please be aware that nurse has other patient's to care for besides her daughter,mother was then given charge nurse to speak with        Esa Juarez RN  08/09/20 3098

## 2020-08-09 NOTE — ED NOTES
Patient awake no distress at this time, remains on continuous observation     Jordy Barrow RN  08/09/20 6319

## 2020-08-09 NOTE — ED NOTES
Called to update mother about plan of care  Patient is to continue to get IV fluids and have CK checked in the AM as per medicines recommendation  Father is at patient's bedside  All parties aware of plan of care       Tuan Jimenez RN  08/09/20 1600

## 2020-08-10 PROBLEM — F25.9 SCHIZOAFFECTIVE DISORDER (HCC): Status: ACTIVE | Noted: 2020-08-10

## 2020-08-10 PROBLEM — M62.82 RHABDOMYOLYSIS: Status: ACTIVE | Noted: 2020-08-10

## 2020-08-10 PROBLEM — Z72.0 TOBACCO ABUSE: Status: ACTIVE | Noted: 2020-08-10

## 2020-08-10 PROBLEM — R45.851 SUICIDE IDEATION: Status: ACTIVE | Noted: 2020-08-10

## 2020-08-10 PROBLEM — R82.90 ABNORMAL URINALYSIS: Status: ACTIVE | Noted: 2020-08-10

## 2020-08-10 LAB
CK MB SERPL-MCNC: 1.7 NG/ML (ref 0–5)
CK MB SERPL-MCNC: <1 % (ref 0–2.5)
CK SERPL-CCNC: 3054 U/L (ref 26–192)

## 2020-08-10 PROCEDURE — 87086 URINE CULTURE/COLONY COUNT: CPT | Performed by: EMERGENCY MEDICINE

## 2020-08-10 PROCEDURE — 36415 COLL VENOUS BLD VENIPUNCTURE: CPT | Performed by: EMERGENCY MEDICINE

## 2020-08-10 PROCEDURE — 82553 CREATINE MB FRACTION: CPT | Performed by: EMERGENCY MEDICINE

## 2020-08-10 PROCEDURE — 99223 1ST HOSP IP/OBS HIGH 75: CPT | Performed by: INTERNAL MEDICINE

## 2020-08-10 PROCEDURE — 82550 ASSAY OF CK (CPK): CPT | Performed by: EMERGENCY MEDICINE

## 2020-08-10 PROCEDURE — 96361 HYDRATE IV INFUSION ADD-ON: CPT

## 2020-08-10 RX ORDER — POLYETHYLENE GLYCOL 3350 17 G/17G
17 POWDER, FOR SOLUTION ORAL DAILY PRN
Status: DISCONTINUED | OUTPATIENT
Start: 2020-08-10 | End: 2020-08-14 | Stop reason: HOSPADM

## 2020-08-10 RX ORDER — ACETAMINOPHEN 325 MG/1
650 TABLET ORAL EVERY 6 HOURS PRN
Status: DISCONTINUED | OUTPATIENT
Start: 2020-08-10 | End: 2020-08-14 | Stop reason: HOSPADM

## 2020-08-10 RX ORDER — TRAZODONE HYDROCHLORIDE 50 MG/1
50 TABLET ORAL
Status: DISCONTINUED | OUTPATIENT
Start: 2020-08-10 | End: 2020-08-14 | Stop reason: HOSPADM

## 2020-08-10 RX ORDER — ONDANSETRON 2 MG/ML
4 INJECTION INTRAMUSCULAR; INTRAVENOUS EVERY 6 HOURS PRN
Status: DISCONTINUED | OUTPATIENT
Start: 2020-08-10 | End: 2020-08-14 | Stop reason: HOSPADM

## 2020-08-10 RX ADMIN — TRAZODONE HYDROCHLORIDE 50 MG: 50 TABLET ORAL at 21:14

## 2020-08-10 RX ADMIN — SODIUM CHLORIDE 200 ML/HR: 0.9 INJECTION, SOLUTION INTRAVENOUS at 20:37

## 2020-08-10 RX ADMIN — SODIUM CHLORIDE 200 ML/HR: 0.9 INJECTION, SOLUTION INTRAVENOUS at 15:51

## 2020-08-10 RX ADMIN — SODIUM CHLORIDE 200 ML/HR: 0.9 INJECTION, SOLUTION INTRAVENOUS at 03:25

## 2020-08-10 RX ADMIN — HYDROXYZINE HYDROCHLORIDE 50 MG: 25 TABLET, FILM COATED ORAL at 07:09

## 2020-08-10 RX ADMIN — ACETAMINOPHEN 650 MG: 325 TABLET, FILM COATED ORAL at 21:14

## 2020-08-10 NOTE — ED NOTES
Spoke with patient's mother and updated her with patient's status  Mother agreeable  After speaking with mother, patient's anxiety escalated  Spoke with patient and patient was agreeable to PO anxiety medication  Patient requested to speak to mother  I was able to put patient on phone with mother  Patient remains on observation at this time       Tristan Brito RN  08/09/20 7326

## 2020-08-10 NOTE — ED NOTES
8/10/20 @ 0800:  PES sat with patient and provided update  Patient continues with extreme paranoia, but realizes it and wants to work through it  PES provided positive reinforcement  Patient seems to be insightful to her issues and knows that she needs to see a Psychiatrist and Therapist regularly  Patient says, "this quarantine has been very negative for me  Patient says she's been having difficulties with her new job, saying, "I'm overwhelmed and working from home, so when I have questions about something, I don't have anyone to ask "  PES will continue to monitor  Patient said she was agreeable to lab work, as patient isn't currently medically cleared due to her CK results being high  Buddy, MS    1450: PES notes that patient is being admitted medically due to increased CK levels @ 3054  PES notified Ivy Baltazar at [facility] of medical admission    Chase Tabares, MS

## 2020-08-10 NOTE — ASSESSMENT & PLAN NOTE
Per patient's familly, she has not been taking her medication as she got busy starting a new job and was trying to "manage her own medications"  Patient was severely agitated and aggressive on admission - required police to bring her to the ED  · She was seen by Crisis and telepsych - she has been committed   · Continue Haldol 2 5 mg every 6 hours as needed for agitation  · Continue Atarax 50 mg every 8 hours as needed for anxiety  · Monitor electrolytes  · Continue one-to-one for safety  · Consult crisis when patient is medically stable

## 2020-08-10 NOTE — ED NOTES
Patient received from previous shift  Patient is sitting up in bed  Patient reports feeling paranoid, hearing voices, and feeling anxious  Patient remains pleasant and cooperative at this time  Patient is requesting to speak with a psychiatrist  1:1 remains for patient safety  Will continue to monitor          Alina Pozo RN  08/10/20 7531

## 2020-08-10 NOTE — H&P
H&P- Eloise Alfaro 2000, 23 y o  female MRN: [id number]    Unit/Bed#: [address] [place] [address] Encounter: [id number]    Primary Care Provider: James Vanessa DO   Date and time admitted to hospital: 8/7/2020 11:19 AM        * Rhabdomyolysis  Assessment & Plan  CK on 08/07 307  CKD noted to rise to 2,073  Despite aggressive IV hydration, CK continues to rise to 3,054  · Continue IV hydration  · Monitor CK in the morning  · Encourage oral hydration    Abnormal urinalysis  Assessment & Plan  Urinalysis abnormal with innumerable bacteria, moderate epithelial cells, and 4-10 wbc's  · Suspect this was not a clean-catch  · Follow-up new urine culture  · Hold off on antibiotics at this time as patient is without leukocytosis or fevers    Tobacco abuse  Assessment & Plan  Educated on smoking cessation  · Nicotine patch     Schizoaffective disorder ([facility] [address] )  Assessment & Plan  Per patient's familly, she has not been taking her medication as she got busy starting a new job and was trying to "manage her own medications"  Patient was severely agitated and aggressive on admission - required police to bring her to the ED  · She was seen by Crisis and telepsych - she has been committed   · Continue Haldol 2 5 mg every 6 hours as needed for agitation  · Continue Atarax 50 mg every 8 hours as needed for anxiety  · Monitor electrolytes  · Continue one-to-one for safety  · Consult crisis when patient is medically stable     VTE Prophylaxis: Enoxaparin (Lovenox)  / reason for no mechanical VTE prophylaxis on lovenox   Code Status: full code  POLST: POLST form is not discussed and not completed at this time  Discussion with family: Called mom     Anticipated Length of Stay:  Patient will be admitted on an Inpatient basis with an anticipated length of stay of  Greater than 2 midnights  Justification for Hospital Stay: rhabdomyolysis     Total Time for Visit, including Counseling / Coordination of Care: 1 hour    Greater than 50% of this total time spent on direct patient counseling and coordination of care  Chief Complaint:   Psychiatry disorder - noted to have worsening CK on blood work     History of Present Illness:    Tyler Greenfield is a 23 y o  female with a past medical history including schizoaffective disorder who presents with psychiatric disorder  Family guidance received a phone call from patient's parents due to increased paranoia  Family stated the patient was screaming, yelling, and responding to internal stimuli at home  Police were notified and were able to bring the patient to the emergency room for medical evaluation  On arrival the patient was physically and verbally aggressive  Ultimately, patient required 4 point restraints  Patient was evaluated by Crisis in the ED  Patient will be admitted for rhabdomyolysis  When she is medically cleared patient can be re-evaluated by crisis  Presently, patient resting in bed without any complaints  She is requesting to bed washed up  Review of Systems:    Review of Systems   Constitutional: Negative for appetite change, chills and fever  HENT: Negative for congestion, postnasal drip, rhinorrhea and sore throat  Eyes: Negative for photophobia and visual disturbance  Respiratory: Negative for cough, chest tightness, shortness of breath and wheezing  Cardiovascular: Negative for chest pain, palpitations and leg swelling  Gastrointestinal: Negative for abdominal distention, abdominal pain, constipation, diarrhea, nausea and vomiting  Genitourinary: Negative for difficulty urinating, dysuria and hematuria  Musculoskeletal: Negative for arthralgias, gait problem and myalgias  Skin: Negative for rash and wound  Neurological: Negative for dizziness, weakness, light-headedness, numbness and headaches  Psychiatric/Behavioral: Negative for confusion  The patient is nervous/anxious          Past Medical and Surgical History:     Past Medical History:   Diagnosis Date  Murmur, cardiac     Psychiatric disorder        History reviewed  No pertinent surgical history  Meds/Allergies:    Prior to Admission medications    Not on File     I have reviewed home medications using [employer]  (Epic)    Allergies: No Known Allergies    Social History:     Marital Status: Single   Occupation: works from home  Patient Pre-hospital Living Situation: with family  Patient Pre-hospital Level of Mobility: independent   Patient Pre-hospital Diet Restrictions: none   Substance Use History:   Social History     Substance and Sexual Activity   Alcohol Use Yes    Comment: states drank alot tonight     Social History     Tobacco Use   Smoking Status Current Some Day Smoker   Smokeless Tobacco Never Used     Social History     Substance and Sexual Activity   Drug Use Not on file    Comment: sometimes uses mushrooms       Family History:    History reviewed  No pertinent family history  Physical Exam:     Vitals:   Blood Pressure: 148/99 (08/10/20 1515)  Pulse: 68 (08/10/20 1515)  Temperature: 98 4 °F (36 9 °C) (08/10/20 1515)  Temp Source: Oral (08/10/20 1515)  Respirations: 17 (08/10/20 1515)  Height: 5' 5" (165 1 cm) (08/10/20 1515)  Weight - Scale: 61 2 kg (135 lb) (08/10/20 1515)  SpO2: 99 % (08/10/20 1515)    Physical Exam  HENT:      Head: Normocephalic  Neck:      Musculoskeletal: Normal range of motion  Cardiovascular:      Rate and Rhythm: Normal rate  Pulmonary:      Effort: Pulmonary effort is normal    Abdominal:      General: Bowel sounds are normal       Palpations: Abdomen is soft  Musculoskeletal: Normal range of motion  Skin:     General: Skin is warm  Capillary Refill: Capillary refill takes less than 2 seconds  Neurological:      General: No focal deficit present  Mental Status: She is alert and oriented to person, place, and time  Mental status is at baseline  Psychiatric:         Mood and Affect: Mood is anxious and depressed  Affect is tearful  Speech: Speech normal          Behavior: Behavior normal          Cognition and Memory: Cognition normal          Additional Data:     Lab Results: I have personally reviewed pertinent reports  Results from last 7 days   Lab Units 08/07/20  1149   WBC Thousand/uL 10 65*   HEMOGLOBIN g/dL 12 7   HEMATOCRIT % 36 0   PLATELETS Thousands/uL 277   NEUTROS PCT % 85*   LYMPHS PCT % 8*   MONOS PCT % 6   EOS PCT % 0     Results from last 7 days   Lab Units 08/09/20  0750 08/07/20  1523   SODIUM mmol/L 139 140   POTASSIUM mmol/L 3 8 4 0   CHLORIDE mmol/L 104 105   CO2 mmol/L 21 27   BUN mg/dL 9 10   CREATININE mg/dL 1 18 0 89   ANION GAP mmol/L 14* 8   CALCIUM mg/dL 8 9 8 8   ALBUMIN g/dL  --  3 8   TOTAL BILIRUBIN mg/dL  --  0 80   ALK PHOS U/L  --  44*   ALT U/L  --  26   AST U/L  --  30   GLUCOSE RANDOM mg/dL 118 89                       Imaging: I have personally reviewed pertinent reports  No orders to display       EKG, Pathology, and Other Studies Reviewed on Admission:   · EKG: NSR    Allscripts / Epic Records Reviewed: Yes     ** Please Note: This note has been constructed using a voice recognition system   **

## 2020-08-10 NOTE — ED NOTES
Patient calm and cooperative at this time  Pt ambulated to restroom with this nurse to collect urine sample  Gait steady  Patient back in room  1:1 remains       Damien Rea RN  08/10/20 8879

## 2020-08-10 NOTE — ED NOTES
Pt returned to room requesting cup of ice and cup of water  Fresh warm blanket provided  IV fluids nearly completed  Will hang new bag when done  Pt offers no medical complaints at this time   Pt is awake and alert oriented x 3, IV in place to right arm and intact infusing without difficulty           Edison Payton RN  08/10/20 5848

## 2020-08-10 NOTE — ASSESSMENT & PLAN NOTE
Per patient's father, she has not been taking her medication  · Continue Haldol 2 5 mg every 6 hours as needed for agitation  · Continue Atarax 50 mg every 8 hours as needed for anxiety  · Monitor electrolytes  · Continue one-to-one for safety  · Consult crisis when patient is medically stable
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
## 2020-08-10 NOTE — ED NOTES
Patient was offered sandwich with water and chips by her nurse She Gonzalez  08/09/20 2123       Maylin Null  08/09/20 2123

## 2020-08-10 NOTE — ED NOTES
Patient asked to apeak with someone about her anxiety  Nataly Molina for Crisis notified  Patient asks "Am I a bad person? I didn't mean to hurt anyone " Patient tearful and anxious  Will continue to monitor        Miri Ta RN  08/10/20 1444

## 2020-08-10 NOTE — ASSESSMENT & PLAN NOTE
CK on 08/07 307  CKD noted to rise to 2,073  Despite aggressive IV hydration, CK continues to rise to 3,054  · Continue IV hydration  · Monitor CK in the morning  · Encourage oral hydration

## 2020-08-10 NOTE — ED NOTES
Pt observed right side lying eyes closed no distress observed, will occasionally reposition self as needed   1;1 sitter by staff continues      Anahy Harley RN  08/10/20 7107

## 2020-08-10 NOTE — ASSESSMENT & PLAN NOTE
Urinalysis abnormal with innumerable bacteria, moderate epithelial cells, and 4-10 wbc's      · Suspect this was not a clean-catch  · Follow-up new urine culture  · Hold off on antibiotics at this time as patient is without leukocytosis or fevers

## 2020-08-10 NOTE — ASSESSMENT & PLAN NOTE
Urinalysis abnormal with innumerable bacteria, moderate epithelial cells, and 4-10 wbc's      · Suspect this was not a clean-catch  · Will obtain a new sample for a urine culture  · Hold off on antibiotics at this time as patient is without leukocytosis or fevers

## 2020-08-10 NOTE — ASSESSMENT & PLAN NOTE
CK on 8/7 307  CK on 8/9 2,073  CK on 8/10 3,054 -> decision was made to admit to general medicine due to rising CK  · CK level this morning worsened to 3,600 despite NS at 200 mL/hr  · Will increase IVF to NS at 250 mL/hr and monitor CK in the am   · Encourage oral hydration

## 2020-08-11 LAB
ALBUMIN SERPL BCP-MCNC: 3.7 G/DL (ref 3.5–5)
ALP SERPL-CCNC: 48 U/L (ref 46–116)
ALT SERPL W P-5'-P-CCNC: 44 U/L (ref 12–78)
ANION GAP SERPL CALCULATED.3IONS-SCNC: 6 MMOL/L (ref 4–13)
AST SERPL W P-5'-P-CCNC: 79 U/L (ref 5–45)
BACTERIA UR CULT: NORMAL
BASOPHILS # BLD AUTO: 0.08 THOUSANDS/ΜL (ref 0–0.1)
BASOPHILS NFR BLD AUTO: 2 % (ref 0–1)
BILIRUB SERPL-MCNC: 0.4 MG/DL (ref 0.2–1)
BUN SERPL-MCNC: 3 MG/DL (ref 5–25)
CALCIUM SERPL-MCNC: 8.2 MG/DL (ref 8.3–10.1)
CHLORIDE SERPL-SCNC: 107 MMOL/L (ref 100–108)
CK MB SERPL-MCNC: 1.1 NG/ML (ref 0–5)
CK MB SERPL-MCNC: 1.3 NG/ML (ref 0–5)
CK MB SERPL-MCNC: <1 % (ref 0–2.5)
CK MB SERPL-MCNC: <1 % (ref 0–2.5)
CK SERPL-CCNC: 3601 U/L (ref 26–192)
CK SERPL-CCNC: 4123 U/L (ref 26–192)
CO2 SERPL-SCNC: 27 MMOL/L (ref 21–32)
CREAT SERPL-MCNC: 0.86 MG/DL (ref 0.6–1.3)
EOSINOPHIL # BLD AUTO: 0.17 THOUSAND/ΜL (ref 0–0.61)
EOSINOPHIL NFR BLD AUTO: 3 % (ref 0–6)
ERYTHROCYTE [DISTWIDTH] IN BLOOD BY AUTOMATED COUNT: 13.7 % (ref 11.6–15.1)
GFR SERPL CREATININE-BSD FRML MDRD: 111 ML/MIN/1.73SQ M
GLUCOSE SERPL-MCNC: 89 MG/DL (ref 65–140)
HCT VFR BLD AUTO: 35.9 % (ref 34.8–46.1)
HGB BLD-MCNC: 12.2 G/DL (ref 11.5–15.4)
IMM GRANULOCYTES # BLD AUTO: 0.01 THOUSAND/UL (ref 0–0.2)
IMM GRANULOCYTES NFR BLD AUTO: 0 % (ref 0–2)
LYMPHOCYTES # BLD AUTO: 2.57 THOUSANDS/ΜL (ref 0.6–4.47)
LYMPHOCYTES NFR BLD AUTO: 47 % (ref 14–44)
MAGNESIUM SERPL-MCNC: 1.7 MG/DL (ref 1.6–2.6)
MCH RBC QN AUTO: 28 PG (ref 26.8–34.3)
MCHC RBC AUTO-ENTMCNC: 34 G/DL (ref 31.4–37.4)
MCV RBC AUTO: 82 FL (ref 82–98)
MONOCYTES # BLD AUTO: 0.66 THOUSAND/ΜL (ref 0.17–1.22)
MONOCYTES NFR BLD AUTO: 12 % (ref 4–12)
NEUTROPHILS # BLD AUTO: 1.96 THOUSANDS/ΜL (ref 1.85–7.62)
NEUTS SEG NFR BLD AUTO: 36 % (ref 43–75)
NRBC BLD AUTO-RTO: 0 /100 WBCS
PHOSPHATE SERPL-MCNC: 3.5 MG/DL (ref 2.7–4.5)
PLATELET # BLD AUTO: 242 THOUSANDS/UL (ref 149–390)
PMV BLD AUTO: 9.8 FL (ref 8.9–12.7)
POTASSIUM SERPL-SCNC: 3.6 MMOL/L (ref 3.5–5.3)
PROT SERPL-MCNC: 7.3 G/DL (ref 6.4–8.2)
RBC # BLD AUTO: 4.36 MILLION/UL (ref 3.81–5.12)
SODIUM SERPL-SCNC: 140 MMOL/L (ref 136–145)
WBC # BLD AUTO: 5.45 THOUSAND/UL (ref 4.31–10.16)

## 2020-08-11 PROCEDURE — 82550 ASSAY OF CK (CPK): CPT | Performed by: NURSE PRACTITIONER

## 2020-08-11 PROCEDURE — 85025 COMPLETE CBC W/AUTO DIFF WBC: CPT | Performed by: NURSE PRACTITIONER

## 2020-08-11 PROCEDURE — 84100 ASSAY OF PHOSPHORUS: CPT | Performed by: NURSE PRACTITIONER

## 2020-08-11 PROCEDURE — 82553 CREATINE MB FRACTION: CPT | Performed by: NURSE PRACTITIONER

## 2020-08-11 PROCEDURE — 80053 COMPREHEN METABOLIC PANEL: CPT | Performed by: NURSE PRACTITIONER

## 2020-08-11 PROCEDURE — 83735 ASSAY OF MAGNESIUM: CPT | Performed by: NURSE PRACTITIONER

## 2020-08-11 PROCEDURE — 99232 SBSQ HOSP IP/OBS MODERATE 35: CPT | Performed by: NURSE PRACTITIONER

## 2020-08-11 RX ORDER — POTASSIUM CHLORIDE 20 MEQ/1
40 TABLET, EXTENDED RELEASE ORAL ONCE
Status: COMPLETED | OUTPATIENT
Start: 2020-08-11 | End: 2020-08-11

## 2020-08-11 RX ADMIN — NICOTINE 1 PATCH: 7 PATCH TRANSDERMAL at 09:32

## 2020-08-11 RX ADMIN — ACETAMINOPHEN 650 MG: 325 TABLET, FILM COATED ORAL at 02:39

## 2020-08-11 RX ADMIN — SODIUM CHLORIDE 250 ML/HR: 0.9 INJECTION, SOLUTION INTRAVENOUS at 22:57

## 2020-08-11 RX ADMIN — MAGNESIUM OXIDE TAB 400 MG (241.3 MG ELEMENTAL MG) 400 MG: 400 (241.3 MG) TAB at 11:00

## 2020-08-11 RX ADMIN — SODIUM CHLORIDE 200 ML/HR: 0.9 INJECTION, SOLUTION INTRAVENOUS at 01:21

## 2020-08-11 RX ADMIN — BENZTROPINE MESYLATE 1 MG: 1 TABLET ORAL at 09:32

## 2020-08-11 RX ADMIN — SODIUM CHLORIDE 200 ML/HR: 0.9 INJECTION, SOLUTION INTRAVENOUS at 06:14

## 2020-08-11 RX ADMIN — TRAZODONE HYDROCHLORIDE 50 MG: 50 TABLET ORAL at 21:02

## 2020-08-11 RX ADMIN — POTASSIUM CHLORIDE 40 MEQ: 1500 TABLET, EXTENDED RELEASE ORAL at 11:00

## 2020-08-11 RX ADMIN — HALOPERIDOL 2.5 MG: 1 TABLET ORAL at 15:52

## 2020-08-11 RX ADMIN — SODIUM CHLORIDE 250 ML/HR: 0.9 INJECTION, SOLUTION INTRAVENOUS at 19:03

## 2020-08-11 RX ADMIN — ACETAMINOPHEN 650 MG: 325 TABLET, FILM COATED ORAL at 09:32

## 2020-08-11 RX ADMIN — HYDROXYZINE HYDROCHLORIDE 50 MG: 25 TABLET, FILM COATED ORAL at 15:51

## 2020-08-11 RX ADMIN — ACETAMINOPHEN 650 MG: 325 TABLET, FILM COATED ORAL at 19:07

## 2020-08-11 NOTE — UTILIZATION REVIEW
Continued Stay Review    Date: 8/11/20                        Current Patient Class: inpatient  Current Level of Care: med surg  HPI:20 y o  female initially admitted on 8/10/20  Assessment/Plan:   CK rising, from 3601 this morning, now at 4123 despite ivf  To remain on ivf @ 250cc/hr, follow up labs scheduled  Pt anxious today 2nd external stimuli of lights, bells, alarms, has had cogentin, haldol & atarax today (each x 1 dose)   Remains on 1:1 observation for safety  Pertinent Labs/Diagnostic Results:   Results from last 7 days   Lab Units 08/07/20  1151   SARS-COV-2  Negative     Results from last 7 days   Lab Units 08/11/20  0535 08/07/20  1149   WBC Thousand/uL 5 45 10 65*   HEMOGLOBIN g/dL 12 2 12 7   HEMATOCRIT % 35 9 36 0   PLATELETS Thousands/uL 242 277   NEUTROS ABS Thousands/µL 1 96 9 12*     Results from last 7 days   Lab Units 08/11/20  0535 08/09/20  0750 08/07/20  1523 08/07/20  1149   SODIUM mmol/L 140 139 140 138   POTASSIUM mmol/L 3 6 3 8 4 0 3 5   CHLORIDE mmol/L 107 104 105 102   CO2 mmol/L 27 21 27 19*   ANION GAP mmol/L 6 14* 8 17*   BUN mg/dL 3* 9 10 10   CREATININE mg/dL 0 86 1 18 0 89 1 36*   EGFR ml/min/1 73sq m 111 76 106 64   CALCIUM mg/dL 8 2* 8 9 8 8 9 2   MAGNESIUM mg/dL 1 7  --   --   --    PHOSPHORUS mg/dL 3 5  --   --   --      Results from last 7 days   Lab Units 08/11/20  0535 08/07/20  1523 08/07/20  1149   AST U/L 79* 30 21   ALT U/L 44 26 21   ALK PHOS U/L 48 44* 46   TOTAL PROTEIN g/dL 7 3 7 4 7 9   ALBUMIN g/dL 3 7 3 8 3 9   TOTAL BILIRUBIN mg/dL 0 40 0 80 0 60     Results from last 7 days   Lab Units 08/11/20  0535 08/09/20  0750 08/07/20  1523 08/07/20  1149   GLUCOSE RANDOM mg/dL 89 118 89 170*     Results from last 7 days   Lab Units 08/11/20  1425 08/11/20  0535 08/10/20  1201   CK TOTAL U/L 4,123* 3,601* 3,054*   CK MB INDEX % <1 0 <1 0 <1 0   CK MB ng/mL 1 1 1 3 1 7     Results from last 7 days   Lab Units 08/08/20  0608   CLARITY UA  Slightly Cloudy   COLOR UA  Yellow SPEC GRAV UA  <=1 005   PH UA  6 0   GLUCOSE UA mg/dl Negative   KETONES UA mg/dl 15 (1+)*   BLOOD UA  Small*   PROTEIN UA mg/dl Negative   NITRITE UA  Negative   BILIRUBIN UA  Negative   UROBILINOGEN UA E U /dl 0 2   LEUKOCYTES UA  Negative   WBC UA /hpf 4-10*   RBC UA /hpf 1-2*   BACTERIA UA /hpf Innumerable*   EPITHELIAL CELLS WET PREP /hpf Moderate*     Results from last 7 days   Lab Units 08/08/20  0608   AMPH/METH  Negative   BARBITURATE UR  Negative   BENZODIAZEPINE UR  Negative   COCAINE UR  Negative   METHADONE URINE  Negative   OPIATE UR  Negative   PCP UR  Negative   THC UR  Positive*     Results from last 7 days   Lab Units 08/07/20  1523 08/07/20  1149   ETHANOL LVL mg/dL  --  <3   ACETAMINOPHEN LVL ug/mL <2*  --    SALICYLATE LVL mg/dL <3*  --        Results from last 7 days   Lab Units 08/10/20  1349   URINE CULTURE  No Growth <1000 cfu/mL     Vital Signs: /89 (BP Location: Left arm)   Pulse 55   Temp 98 °F (36 7 °C) (Tympanic)   Resp 18   Ht 5' 5" (1 651 m)   Wt 61 2 kg (135 lb)   LMP  (LMP Unknown)   SpO2 100%   BMI 22 47 kg/m²     Medications:   nicotine, 1 patch, Transdermal, Daily  traZODone, 50 mg, Oral, HS    Continuous IV Infusions:  sodium chloride, 250 mL/hr, Intravenous, Continuous    PRN Meds:  acetaminophen, 650 mg, Oral, Q6H PRN  benztropine, 1 mg, Oral, Q6H PRN  haloperidol, 2 5 mg, Oral, Q6H PRN  haloperidol lactate, 2 5 mg, Intramuscular, Q6H PRN  hydrOXYzine HCL, 50 mg, Oral, Q8H PRN  ondansetron, 4 mg, Intravenous, Q6H PRN  polyethylene glycol, 17 g, Oral, Daily PRN    Discharge Plan: d  Network Utilization Review Department  [email or web address]  ATTENTION: Please call with any questions or concerns to [phone] and carefully listen to the prompts so that you are directed to the right person   All voicemails are confidential   Sudie Crigler all requests for admission clinical reviews, approved or denied determinations and any other requests to dedicated fax number below belonging to the campus where the patient is receiving treatment   List of dedicated fax numbers for the Facilities:  [address] [place] [address] DENIALS (Administrative/Medical Necessity) [phone]   [address]  (Maternity/NICU/Pediatrics) [phone]   Alisha Yarely [phone]   Chelo Hernandez [phone]   [address] [place] [address] [phone]   145 [facility]  [phone]   Kari [address]86 [facility]7 [facility] [phone]   [facility]  [phone]   [address]00 [facility] [address]  2401 [facility] [address] [facility] [phone]

## 2020-08-11 NOTE — PROGRESS NOTES
Progress Note - Eloise Alfaro 2000, 23 y o  female MRN: [id number]    Unit/Bed#: [address] [address] Encounter: [id number]    Primary Care Provider: Sepideh Schultz DO   Date and time admitted to hospital: 8/7/2020 11:19 AM        * Rhabdomyolysis  Assessment & Plan  CK on 8/7 307  CK on 8/9 2,073  CK on 8/10 3,054 -> decision was made to admit to general medicine due to rising CK  · CK level this morning worsened to 3,600 despite NS at 200 mL/hr  · Will increase IVF to NS at 250 mL/hr and monitor CK in the am   · Encourage oral hydration    Abnormal urinalysis  Assessment & Plan  Urinalysis abnormal with innumerable bacteria, moderate epithelial cells, and 4-10 wbc's  · Suspect this was not a clean-catch  · Follow-up new urine culture  · Hold off on antibiotics at this time as patient is without leukocytosis or fevers    Tobacco abuse  Assessment & Plan  Educated on smoking cessation  · Nicotine patch     Schizoaffective disorder ([facility] [address] )  Assessment & Plan  Per patient's familly, she has not been taking her medication as she got busy starting a new job and was trying to "manage her own medications"  Patient was severely agitated and aggressive on admission - required police to bring her to the ED  · She was seen by Crisis and telepsych - she has been committed   · Continue Haldol 2 5 mg every 6 hours as needed for agitation  · Continue Atarax 50 mg every 8 hours as needed for anxiety  · Monitor electrolytes  · Continue one-to-one for safety  · Consult crisis when patient is medically stable     VTE Pharmacologic Prophylaxis:   Pharmacologic: Enoxaparin (Lovenox)  Mechanical VTE Prophylaxis in Place: Yes    Patient Centered Rounds: I have performed bedside rounds with nursing staff today  Discussions with Specialists or Other Care Team Provider: nursing, CM    Education and Discussions with Family / Patient: I have answered all questions to the best of my ability  Spoke to mom      Time Spent for Care: 30 minutes  More than 50% of total time spent on counseling and coordination of care as described above  Current Length of Stay: 1 day(s)    Current Patient Status: Inpatient   Certification Statement: The patient will continue to require additional inpatient hospital stay due to worsening rhabdomyolysis     Discharge Plan: Patient is not medically stable for discharge today, likely in 24 hours pending CK level     Code Status: Level 1 - Full Code      Subjective:   Reports having a lot of anxiety today  She states there is a lot of external stimuli including lights, bells, and alarms  Requesting to take a shower  Denies any headache, dizziness, chest pain, shortness of breath vomiting, or diarrhea  Objective:     Vitals:   Temp (24hrs), Av 1 °F (36 7 °C), Min:97 6 °F (36 4 °C), Max:98 4 °F (36 9 °C)    Temp:  [97 6 °F (36 4 °C)-98 4 °F (36 9 °C)] 98 °F (36 7 °C)  HR:  [67-71] 70  Resp:  [16-18] 18  BP: (139-150)/(78-99) 149/87  SpO2:  [99 %-100 %] 100 %  Body mass index is 22 47 kg/m²  Input and Output Summary (last 24 hours): Intake/Output Summary (Last 24 hours) at 2020 0952  Last data filed at 2020 0932  Gross per 24 hour   Intake 4593 34 ml   Output 800 ml   Net 3793 34 ml       Physical Exam:     Physical Exam  HENT:      Head: Normocephalic  Neck:      Musculoskeletal: Normal range of motion  Cardiovascular:      Rate and Rhythm: Normal rate and regular rhythm  Pulmonary:      Effort: Pulmonary effort is normal       Breath sounds: Normal breath sounds  Abdominal:      General: Bowel sounds are normal       Palpations: Abdomen is soft  Musculoskeletal: Normal range of motion  Skin:     General: Skin is warm and dry  Capillary Refill: Capillary refill takes less than 2 seconds  Neurological:      Mental Status: She is alert and oriented to person, place, and time  Mental status is at baseline     Psychiatric:         Mood and Affect: Mood is anxious and depressed  Affect is tearful  Speech: Speech normal          Behavior: Behavior is cooperative  Thought Content: Thought content is paranoid  Cognition and Memory: Cognition normal          Additional Data:     Labs:    Results from last 7 days   Lab Units 08/11/20  0535   WBC Thousand/uL 5 45   HEMOGLOBIN g/dL 12 2   HEMATOCRIT % 35 9   PLATELETS Thousands/uL 242   NEUTROS PCT % 36*   LYMPHS PCT % 47*   MONOS PCT % 12   EOS PCT % 3     Results from last 7 days   Lab Units 08/11/20  0535   POTASSIUM mmol/L 3 6   CHLORIDE mmol/L 107   CO2 mmol/L 27   BUN mg/dL 3*   CREATININE mg/dL 0 86   CALCIUM mg/dL 8 2*   ALK PHOS U/L 48   ALT U/L 44   AST U/L 79*           * I Have Reviewed All Lab Data Listed Above  * Additional Pertinent Lab Tests Reviewed:  All Labs Within Last 24 Hours Reviewed    Imaging:    Imaging Reports Reviewed Today Include: None  Imaging Personally Reviewed by Myself Includes:  None     Recent Cultures (last 7 days):           Last 24 Hours Medication List:   Current Facility-Administered Medications   Medication Dose Route Frequency Provider Last Rate    acetaminophen  650 mg Oral Q6H PRN ERNESTO Youngblood      benztropine  1 mg Oral Q6H PRN ERNESTO Youngblood      haloperidol  2 5 mg Oral Q6H PRN ERNESTO Youngblood      haloperidol lactate  2 5 mg Intramuscular Q6H PRN ERNESTO Youngblood      hydrOXYzine HCL  50 mg Oral Q8H PRN ERNESTO Youngblood      nicotine  1 patch Transdermal Daily ERNESTO Youngblood      ondansetron  4 mg Intravenous Q6H PRN ERNESTO Youngblood      polyethylene glycol  17 g Oral Daily PRN ERNESTO Youngblood      sodium chloride  250 mL/hr Intravenous Continuous ERNESTO Youngblood 250 mL/hr (08/11/20 0932)    traZODone  50 mg Oral HS ERNESTO Youngblood          Today, Patient Was Seen By: ERNESTO Youngblood    ** Please Note: Dictation voice to text software may have been used in the creation of this document   **

## 2020-08-11 NOTE — UTILIZATION REVIEW
Initial Clinical Review    Admission: Date/Time/Statement:   Admission Orders (From admission, onward)     Ordered        08/10/20 1405  Inpatient Admission  Once                   Orders Placed This Encounter   Procedures    Inpatient Admission     Standing Status:   Standing     Number of Occurrences:   1     Order Specific Question:   Admitting Physician     Answer:   Fe Ards     Order Specific Question:   Level of Care     Answer:   Med Surg [16]     Order Specific Question:   Estimated length of stay     Answer:   More than 2 Midnights     Order Specific Question:   Certification     Answer:   I certify that inpatient services are medically necessary for this patient for a duration of greater than two midnights  See H&P and MD Progress Notes for additional information about the patient's course of treatment  ED Arrival Information     Expected Arrival Acuity Means of Arrival Escorted By Service Admission Type    - 8/7/2020 11:09 Emergent Walk-In Police Hospitalist Emergency    Arrival Complaint    Crisis        Chief Complaint   Patient presents with    Psychiatric Evaluation     Patient arrived via police for crisis     Assessment/Plan:   23 y o  female with a past medical history including schizoaffective disorder who presents ambulatory with police for  psychiatric disorder on 8/7/20  Family guidance received a phone call from patient's parents due to increased paranoia, screaming, yelling, and responding to internal stimuli at home  On arrival the patient was physically and verbally aggressive  Ultimately, patient required 4 point restraints  Patient initially had mild elevation of CK at 300, psychiatrist wanted repeat CK which was repeated which increased to 2000  Patient received IV hydration but CK continued to get worse  Admitted to inpatient status 8/10/20 for rhabdomyolysis for continued aggressive hydration, follow-up labs       ED Triage Vitals   Temperature Pulse Respirations Blood Pressure SpO2   08/07/20 1429 08/07/20 1429 08/07/20 1429 08/07/20 1429 08/07/20 1429   97 8 °F (36 6 °C) 91 18 129/87 99 %      Temp Source Heart Rate Source Patient Position - Orthostatic VS BP Location FiO2 (%)   08/07/20 1429 08/07/20 1429 08/07/20 1429 08/07/20 1429 --   Tympanic Monitor Lying Right arm       Pain Score       08/10/20 1515       No Pain          Wt Readings from Last 1 Encounters:   08/10/20 61 2 kg (135 lb)     Additional Vital Signs:   08/10/20 2012   98 4 °F (36 9 °C)   67   18   150/78   --   100 %   None (Room air)   Sitting    08/10/20 1515   98 4 °F (36 9 °C)   68   17   148/99   --   99 %   None (Room air)   Sitting    08/10/20 0748   97 6 °F (36 4 °C)   70   18   153/89   --   100 %   None (Room air)   Sitting    08/10/20 0320   98 2 °F (36 8 °C)   64   14   128/76   --   98 %   None (Room air)   Lying      Pertinent Labs/Diagnostic Test Results:   Results from last 7 days   Lab Units 08/07/20  1151   SARS-COV-2  Negative     Results from last 7 days   Lab Units 08/07/20  1149   WBC Thousand/uL 10 65*   HEMOGLOBIN g/dL 12 7   HEMATOCRIT % 36 0   PLATELETS Thousands/uL 277   NEUTROS ABS Thousands/µL 9 12*     Results from last 7 days   Lab Units 08/09/20  0750 08/07/20  1523 08/07/20  1149   SODIUM mmol/L 139 140 138   POTASSIUM mmol/L 3 8 4 0 3 5   CHLORIDE mmol/L 104 105 102   CO2 mmol/L 21 27 19*   ANION GAP mmol/L 14* 8 17*   BUN mg/dL 9 10 10   CREATININE mg/dL 1 18 0 89 1 36*   EGFR ml/min/1 73sq m 76 106 64   CALCIUM mg/dL 8 9 8 8 9 2   MAGNESIUM mg/dL  --   --   --    PHOSPHORUS mg/dL  --   --   --      Results from last 7 days   Lab Units 08/07/20  1523 08/07/20  1149   AST U/L 30 21   ALT U/L 26 21   ALK PHOS U/L 44* 46   TOTAL PROTEIN g/dL 7 4 7 9   ALBUMIN g/dL 3 8 3 9   TOTAL BILIRUBIN mg/dL 0 80 0 60     Results from last 7 days   Lab Units 08/09/20  0750 08/07/20  1523 08/07/20  1149   GLUCOSE RANDOM mg/dL 118 89 170*     Results from last 7 days   Lab Units 08/10/20  1201 08/09/20  1440   CK TOTAL U/L 3,054* 1,965*   CK MB INDEX % <1 0 <1 0   CK MB ng/mL 1 7 1 3     Results from last 7 days   Lab Units 08/08/20  0608   CLARITY UA  Slightly Cloudy   COLOR UA  Yellow   SPEC GRAV UA  <=1 005   PH UA  6 0   GLUCOSE UA mg/dl Negative   KETONES UA mg/dl 15 (1+)*   BLOOD UA  Small*   PROTEIN UA mg/dl Negative   NITRITE UA  Negative   BILIRUBIN UA  Negative   UROBILINOGEN UA E U /dl 0 2   LEUKOCYTES UA  Negative   WBC UA /hpf 4-10*   RBC UA /hpf 1-2*   BACTERIA UA /hpf Innumerable*   EPITHELIAL CELLS WET PREP /hpf Moderate*     Results from last 7 days   Lab Units 08/08/20  0608   AMPH/METH  Negative   BARBITURATE UR  Negative   BENZODIAZEPINE UR  Negative   COCAINE UR  Negative   METHADONE URINE  Negative   OPIATE UR  Negative   PCP UR  Negative   THC UR  Positive*     Results from last 7 days   Lab Units 08/07/20  1523 08/07/20  1149   ETHANOL LVL mg/dL  --  <3   ACETAMINOPHEN LVL ug/mL <2*  --    SALICYLATE LVL mg/dL <3*  --      Results from last 7 days   Lab Units 08/10/20  1349   URINE CULTURE  No Growth <1000 cfu/mL     ED Treatment:   Medication Administration from 08/07/2020 1109 to 08/10/2020 1456       Date/Time Order Dose Route Action     08/07/2020 1116 OLANZapine (ZyPREXA) IM injection 10 mg 10 mg Intramuscular Given     08/07/2020 1113 diphenhydrAMINE (BENADRYL) injection 50 mg 50 mg Intramuscular Given     08/07/2020 1114 LORazepam (ATIVAN) injection 2 mg 2 mg Intramuscular Given     08/09/2020 0007 hydrOXYzine HCL (ATARAX) tablet 50 mg 50 mg Oral Given     08/09/2020 0653 haloperidol (HALDOL) tablet 5 mg 5 mg Oral Given     08/09/2020 0653 OLANZapine (ZyPREXA) IM injection 10 mg 10 mg Intramuscular Given     08/09/2020 0654 LORazepam (ATIVAN) injection 1 mg 1 mg Intramuscular Given     08/09/2020 0912 sodium chloride 0 9 % bolus 1,000 mL 1,000 mL Intravenous New Bag     08/09/2020 0911 midazolam (VERSED) injection 5 mg 5 mg Intramuscular Not Given 08/09/2020 0919 sodium chloride 0 9 % bolus 1,000 mL 1,000 mL Intravenous New Bag     08/10/2020 0325 sodium chloride 0 9 % infusion 200 mL/hr Intravenous New Bag     08/09/2020 1840 sodium chloride 0 9 % infusion 200 mL/hr Intravenous New Bag     08/09/2020 1556 sodium chloride 0 9 % infusion 200 mL/hr Intravenous Rate/Dose Change     08/09/2020 1211 sodium chloride 0 9 % infusion 125 mL/hr Intravenous New Bag     08/10/2020 0709 hydrOXYzine HCL (ATARAX) tablet 50 mg 50 mg Oral Given     08/09/2020 2043 hydrOXYzine HCL (ATARAX) tablet 50 mg 25 mg Oral Given        Past Medical History:   Diagnosis Date    Murmur, cardiac     Psychiatric disorder      Admitting Diagnosis: Rhabdomyolysis [M62 82]  Psychiatric problem [F99]  Acute psychosis ([facility] [address] ) [F23]  Age/Sex: 23 y o  female  Admission Orders:  1:1 observation    Scheduled Medications:  nicotine, 1 patch, Transdermal, Daily  traZODone, 50 mg, Oral, HS    Continuous IV Infusions:  sodium chloride, 250 mL/hr, Intravenous, Continuous    PRN Meds:  acetaminophen, 650 mg, Oral, Q6H PRN  benztropine, 1 mg, Oral, Q6H PRN  haloperidol, 2 5 mg, Oral, Q6H PRN  haloperidol lactate, 2 5 mg, Intramuscular, Q6H PRN  hydrOXYzine HCL, 50 mg, Oral, Q8H PRN  ondansetron, 4 mg, Intravenous, Q6H PRN  polyethylene glycol, 17 g, Oral, Daily PRN    Network Utilization Review Department  [email or web address]  ATTENTION: Please call with any questions or concerns to [phone] and carefully listen to the prompts so that you are directed to the right person  All voicemails are confidential   Kimberly Mckeon all requests for admission clinical reviews, approved or denied determinations and any other requests to dedicated fax number below belonging to the campus where the patient is receiving treatment   List of dedicated fax numbers for the Facilities:  [address] DENIALS (Administrative/Medical Necessity) [phone]   [address] [place] [address] (Maternity/NICU/Pediatrics) [phone]     Tan Benitez [phone]   Micaela Pleasant [phone]   [address] [place] [address] [phone]   [address] [place] [address] Jorge [address]25 [facility] [phone]   Carlos Araujoes [phone] [address] [facility] [address]  [address] [facility] [address] [phone]

## 2020-08-12 LAB
ANION GAP SERPL CALCULATED.3IONS-SCNC: 6 MMOL/L (ref 4–13)
BUN SERPL-MCNC: 6 MG/DL (ref 5–25)
CALCIUM SERPL-MCNC: 7.9 MG/DL (ref 8.3–10.1)
CHLORIDE SERPL-SCNC: 109 MMOL/L (ref 100–108)
CK MB SERPL-MCNC: 0.7 NG/ML (ref 0–5)
CK MB SERPL-MCNC: <1 % (ref 0–2.5)
CK SERPL-CCNC: 2157 U/L (ref 26–192)
CO2 SERPL-SCNC: 25 MMOL/L (ref 21–32)
CREAT SERPL-MCNC: 0.95 MG/DL (ref 0.6–1.3)
GFR SERPL CREATININE-BSD FRML MDRD: 98 ML/MIN/1.73SQ M
GLUCOSE SERPL-MCNC: 85 MG/DL (ref 65–140)
POTASSIUM SERPL-SCNC: 3.9 MMOL/L (ref 3.5–5.3)
SODIUM SERPL-SCNC: 140 MMOL/L (ref 136–145)

## 2020-08-12 PROCEDURE — 82550 ASSAY OF CK (CPK): CPT | Performed by: NURSE PRACTITIONER

## 2020-08-12 PROCEDURE — 80048 BASIC METABOLIC PNL TOTAL CA: CPT | Performed by: NURSE PRACTITIONER

## 2020-08-12 PROCEDURE — 82553 CREATINE MB FRACTION: CPT | Performed by: NURSE PRACTITIONER

## 2020-08-12 PROCEDURE — 99232 SBSQ HOSP IP/OBS MODERATE 35: CPT | Performed by: NURSE PRACTITIONER

## 2020-08-12 RX ORDER — LORAZEPAM 0.5 MG/1
0.5 TABLET ORAL ONCE
Status: COMPLETED | OUTPATIENT
Start: 2020-08-12 | End: 2020-08-12

## 2020-08-12 RX ADMIN — TRAZODONE HYDROCHLORIDE 50 MG: 50 TABLET ORAL at 21:03

## 2020-08-12 RX ADMIN — SODIUM CHLORIDE 250 ML/HR: 0.9 INJECTION, SOLUTION INTRAVENOUS at 11:44

## 2020-08-12 RX ADMIN — SODIUM CHLORIDE 250 ML/HR: 0.9 INJECTION, SOLUTION INTRAVENOUS at 06:55

## 2020-08-12 RX ADMIN — SODIUM CHLORIDE 250 ML/HR: 0.9 INJECTION, SOLUTION INTRAVENOUS at 15:45

## 2020-08-12 RX ADMIN — SODIUM CHLORIDE 250 ML/HR: 0.9 INJECTION, SOLUTION INTRAVENOUS at 02:38

## 2020-08-12 RX ADMIN — LORAZEPAM 0.5 MG: 0.5 TABLET ORAL at 12:59

## 2020-08-12 RX ADMIN — SODIUM CHLORIDE 250 ML/HR: 0.9 INJECTION, SOLUTION INTRAVENOUS at 20:49

## 2020-08-12 NOTE — UTILIZATION REVIEW
Continued Stay Review    Date:    8/12/20                       Current Patient Class: INPATIENT    Current Level of Care: ACUTE    Assessment/Plan:   Rhabdomyolysis repeat CK 2157 today continue IVF @250  Schizoaffective disorder She was seen by Crisis and telepsych - she has been committed   Continue Haldol prn agitation and Atarax prn anxiety  Continues on 1:1 for safety   Monitor electrolytes   Pertinent Labs/Diagnostic Results:   Results from last 7 days   Lab Units 08/07/20  1151   SARS-COV-2  Negative     Results from last 7 days   Lab Units 08/11/20  0535 08/07/20  1149   WBC Thousand/uL 5 45 10 65*   HEMOGLOBIN g/dL 12 2 12 7   HEMATOCRIT % 35 9 36 0   PLATELETS Thousands/uL 242 277   NEUTROS ABS Thousands/µL 1 96 9 12*     Results from last 7 days   Lab Units 08/12/20  0444 08/11/20  0535 08/09/20  0750 08/07/20  1523 08/07/20  1149   SODIUM mmol/L 140 140 139 140 138   POTASSIUM mmol/L 3 9 3 6 3 8 4 0 3 5   CHLORIDE mmol/L 109* 107 104 105 102   CO2 mmol/L 25 27 21 27 19*   ANION GAP mmol/L 6 6 14* 8 17*   BUN mg/dL 6 3* 9 10 10   CREATININE mg/dL 0 95 0 86 1 18 0 89 1 36*   EGFR ml/min/1 73sq m 98 111 76 106 64   CALCIUM mg/dL 7 9* 8 2* 8 9 8 8 9 2   MAGNESIUM mg/dL  --  1 7  --   --   --    PHOSPHORUS mg/dL  --  3 5  --   --   --      Results from last 7 days   Lab Units 08/11/20  0535 08/07/20  1523 08/07/20  1149   AST U/L 79* 30 21   ALT U/L 44 26 21   ALK PHOS U/L 48 44* 46   TOTAL PROTEIN g/dL 7 3 7 4 7 9   ALBUMIN g/dL 3 7 3 8 3 9   TOTAL BILIRUBIN mg/dL 0 40 0 80 0 60     Results from last 7 days   Lab Units 08/12/20  0444 08/11/20  0535 08/09/20  0750 08/07/20  1523 08/07/20  1149   GLUCOSE RANDOM mg/dL 85 89 118 89 170*     Results from last 7 days   Lab Units 08/12/20  0444 08/11/20  1425 08/11/20  0535   CK TOTAL U/L 2,157* 4,123* 3,601*   CK MB INDEX % <1 0 <1 0 <1 0   CK MB ng/mL 0 7 1 1 1 3     Vital Signs:   08/12/20 0800   98 °F (36 7 °C)   68   18   142/98   113   99 %   None (Room air) Sitting    08/12/20 0430   98 2 °F (36 8 °C)   62   18   127/75   --   98 %   None (         gmf  1:1 observation  Bmp CK  Medications:   Scheduled Medications:  LORazepam, 0 5 mg, Oral, Once  nicotine, 1 patch, Transdermal, Daily  traZODone, 50 mg, Oral, HS      Continuous IV Infusions:  sodium chloride, 250 mL/hr, Intravenous, Continuous      PRN Meds:  acetaminophen, 650 mg, Oral, Q6H PRN  benztropine, 1 mg, Oral, Q6H PRN  haloperidol, 2 5 mg, Oral, Q6H PRN  haloperidol lactate, 2 5 mg, Intramuscular, Q6H PRN  hydrOXYzine HCL, 50 mg, Oral, Q8H PRN  ondansetron, 4 mg, Intravenous, Q6H PRN  polyethylene glycol, 17 g, Oral, Daily PRN        Discharge Plan: TBD    Network Utilization Review Department  [email or web address]  ATTENTION: Please call with any questions or concerns to [phone] and carefully listen to the prompts so that you are directed to the right person  All voicemails are confidential   Aniyah Skipper all requests for admission clinical reviews, approved or denied determinations and any other requests to dedicated fax number below belonging to the campus where the patient is receiving treatment   List of dedicated fax numbers for the Facilities:  [address] [place] [address] DENIALS (Administrative/Medical Necessity) [phone]   [address] (Maternity/NICU/Pediatrics) [phone]   Adalberto Lewis [phone]   Odell Machuca [phone]   Em Hayes [phone]   Theron Oh [phone]   [address] [place] [address] [phone]   [facility]  [phone]   [address] [facility] [address]  2401 [facility] 1000 [address] [facility] [phone]

## 2020-08-12 NOTE — ASSESSMENT & PLAN NOTE
CK on 8/7 307  CK on 8/9 2,073  CK on 8/10 3,054 -> decision was made to admit to general medicine due to rising CK  · CK level worsened to 3,600 despite NS at 200 mL/hr  · Increased IVF to NS at 250 mL/hr; repeat CK 2157  · Continue IV fluids at 250 cc/hour  · Encourage oral hydration

## 2020-08-12 NOTE — ASSESSMENT & PLAN NOTE
Per patient's familly, she has not been taking her medication as she got busy starting a new job and was trying to "manage her own medications"  Patient was severely agitated and aggressive on admission - required police to bring her to the ED  · She was seen by Crisis and telepsych - she has been committed   · Continue Haldol 2 5 mg every 6 hours as needed for agitation  · Continue Atarax 50 mg every 8 hours as needed for anxiety  · Patient stated that she was continued to feel anxious today, did trial 1 time dose of Ativan 0 5 mg during day, monitor response  · Monitor electrolytes; within normal limits  · Continue one-to-one for safety  · Consult crisis when patient is medically stable

## 2020-08-12 NOTE — PROGRESS NOTES
Progress Note - Eloise Alfaro 2000, 23 y o  female MRN: [id number]    Unit/Bed#: [address] [address] Encounter: [id number]    Primary Care Provider: Yamilex Miller DO   Date and time admitted to hospital: 8/7/2020 11:19 AM        * Rhabdomyolysis  Assessment & Plan  CK on 8/7 307  CK on 8/9 2,073  CK on 8/10 3,054 -> decision was made to admit to general medicine due to rising CK  · CK level worsened to 3,600 despite NS at 200 mL/hr  · Increased IVF to NS at 250 mL/hr; repeat CK 2157  · Continue IV fluids at 250 cc/hour  · Encourage oral hydration    Abnormal urinalysis  Assessment & Plan  Urinalysis abnormal with innumerable bacteria, moderate epithelial cells, and 4-10 wbc's  · Suspect this was not a clean-catch  · Follow-up new urine culture; no growth  · Patient demonstrates no urinary symptoms  Schizoaffective disorder [facility])  Assessment & Plan  Per patient's familly, she has not been taking her medication as she got busy starting a new job and was trying to "manage her own medications"  Patient was severely agitated and aggressive on admission - required police to bring her to the ED  · She was seen by Crisis and telepsych - she has been committed   · Continue Haldol 2 5 mg every 6 hours as needed for agitation  · Continue Atarax 50 mg every 8 hours as needed for anxiety  · Patient stated that she was continued to feel anxious today, did trial 1 time dose of Ativan 0 5 mg during day, monitor response  · Monitor electrolytes; within normal limits  · Continue one-to-one for safety  · Consult crisis when patient is medically stable     Tobacco abuse  Assessment & Plan  Educated on smoking cessation  · Nicotine patch           VTE Pharmacologic Prophylaxis:   Pharmacologic: Pharmacologic VTE Prophylaxis contraindicated due to Low risk  Mechanical VTE Prophylaxis in Place: No    Patient Centered Rounds: I have performed bedside rounds with nursing staff today      Discussions with Specialists or Other Care Team Provider:  Multi disciplinary team    Education and Discussions with Family / Patient:  I spoke with the patient at the bedside, I have answered all questions to the best of my abilities  Time Spent for Care: 30 minutes  More than 50% of total time spent on counseling and coordination of care as described above  Current Length of Stay: 2 day(s)    Current Patient Status: Inpatient   Certification Statement: The patient will continue to require additional inpatient hospital stay due to Continued IV fluids, repeat CK in a m  [place] Side Discharge Plan:  Not stable for discharge today  Code Status: Level 1 - Full Code      Subjective:   Patient seen sitting comfortably in bed, watching TV, resting quietly  She is pleasant and cooperative with exam   Her conversation is appropriate, she does not appear distressed in any manner  She does admit to still feeling some anxiety at times, reports that she has felt anxious like this off and on for her entire life  She denies headache, dizziness, shortness of breath, chest pain, abdominal pain, nausea, vomiting or diarrhea  Objective:     Vitals:   Temp (24hrs), Av 9 °F (36 6 °C), Min:97 6 °F (36 4 °C), Max:98 2 °F (36 8 °C)    Temp:  [97 6 °F (36 4 °C)-98 2 °F (36 8 °C)] 98 °F (36 7 °C)  HR:  [55-68] 68  Resp:  [18] 18  BP: (125-142)/(75-98) 142/98  SpO2:  [98 %-100 %] 99 %  Body mass index is 22 47 kg/m²  Input and Output Summary (last 24 hours): Intake/Output Summary (Last 24 hours) at 2020 1602  Last data filed at 2020 1523  Gross per 24 hour   Intake 5660 ml   Output 93982 ml   Net -9140 ml       Physical Exam:     Physical Exam  Vitals signs and nursing note reviewed  Constitutional:       Appearance: Normal appearance  HENT:      Head: Normocephalic  Nose: Nose normal       Mouth/Throat:      Mouth: Mucous membranes are moist    Eyes:      Extraocular Movements: Extraocular movements intact        Conjunctiva/sclera: Conjunctivae normal       Pupils: Pupils are equal, round, and reactive to light  Neck:      Musculoskeletal: Normal range of motion  Cardiovascular:      Rate and Rhythm: Normal rate  Heart sounds: Normal heart sounds  Pulmonary:      Effort: Pulmonary effort is normal       Breath sounds: Normal breath sounds  Abdominal:      General: Abdomen is flat  Bowel sounds are normal       Palpations: Abdomen is soft  Genitourinary:     Comments: Voiding spontaneously  Musculoskeletal: Normal range of motion  General: No swelling  Skin:     General: Skin is warm and dry  Capillary Refill: Capillary refill takes less than 2 seconds  Neurological:      Mental Status: She is alert and oriented to person, place, and time  Psychiatric:         Mood and Affect: Mood normal          Behavior: Behavior normal          Thought Content: Thought content normal          Judgment: Judgment normal       Comments: Pleasant and cooperative during exam          Additional Data:     Labs:    Results from last 7 days   Lab Units 08/11/20  0535   WBC Thousand/uL 5 45   HEMOGLOBIN g/dL 12 2   HEMATOCRIT % 35 9   PLATELETS Thousands/uL 242   NEUTROS PCT % 36*   LYMPHS PCT % 47*   MONOS PCT % 12   EOS PCT % 3     Results from last 7 days   Lab Units 08/12/20  0444 08/11/20  0535   POTASSIUM mmol/L 3 9 3 6   CHLORIDE mmol/L 109* 107   CO2 mmol/L 25 27   BUN mg/dL 6 3*   CREATININE mg/dL 0 95 0 86   CALCIUM mg/dL 7 9* 8 2*   ALK PHOS U/L  --  48   ALT U/L  --  44   AST U/L  --  79*           * I Have Reviewed All Lab Data Listed Above  * Additional Pertinent Lab Tests Reviewed:  All Labs Within Last 24 Hours Reviewed    Imaging:    Imaging Reports Reviewed Today Include:  None  Imaging Personally Reviewed by Myself Includes:  None    Recent Cultures (last 7 days):     Results from last 7 days   Lab Units 08/10/20  1349   URINE CULTURE  No Growth <1000 cfu/mL       Last 24 Hours Medication List:   Current Facility-Administered Medications   Medication Dose Route Frequency Provider Last Rate    acetaminophen  650 mg Oral Q6H PRN Kaye Megan, CRNP      benztropine  1 mg Oral Q6H PRN Kaye Megan, CRNP      haloperidol  2 5 mg Oral Q6H PRN Kaye Megan, CRNP      haloperidol lactate  2 5 mg Intramuscular Q6H PRN Kaye Megan, CRNP      hydrOXYzine HCL  50 mg Oral Q8H PRN Kaye Megan, CRNP      nicotine  1 patch Transdermal Daily Kaye Megan, ROSANNENP      ondansetron  4 mg Intravenous Q6H PRN Kaye Megan, CRNP      polyethylene glycol  17 g Oral Daily PRN Kaye Megan, CRNP      sodium chloride  250 mL/hr Intravenous Continuous Kaye Guzman, ERNESTO 250 mL/hr (08/12/20 7565)    traZODone  50 mg Oral HS ERNESTO Gutierrez          Today, Patient Was Seen By: ERNESTO Rob    ** Please Note: Dictation voice to text software may have been used in the creation of this document   ** fair balance

## 2020-08-12 NOTE — ASSESSMENT & PLAN NOTE
Urinalysis abnormal with innumerable bacteria, moderate epithelial cells, and 4-10 wbc's  · Suspect this was not a clean-catch  · Follow-up new urine culture; no growth  · Patient demonstrates no urinary symptoms

## 2020-08-13 LAB
ANION GAP SERPL CALCULATED.3IONS-SCNC: 5 MMOL/L (ref 4–13)
BUN SERPL-MCNC: 8 MG/DL (ref 5–25)
CALCIUM SERPL-MCNC: 8.3 MG/DL (ref 8.3–10.1)
CHLORIDE SERPL-SCNC: 106 MMOL/L (ref 100–108)
CK MB SERPL-MCNC: 0.7 NG/ML (ref 0–5)
CK MB SERPL-MCNC: <1 % (ref 0–2.5)
CK SERPL-CCNC: 1127 U/L (ref 26–192)
CO2 SERPL-SCNC: 28 MMOL/L (ref 21–32)
CREAT SERPL-MCNC: 0.91 MG/DL (ref 0.6–1.3)
GFR SERPL CREATININE-BSD FRML MDRD: 104 ML/MIN/1.73SQ M
GLUCOSE SERPL-MCNC: 91 MG/DL (ref 65–140)
POTASSIUM SERPL-SCNC: 4 MMOL/L (ref 3.5–5.3)
SODIUM SERPL-SCNC: 139 MMOL/L (ref 136–145)

## 2020-08-13 PROCEDURE — 99232 SBSQ HOSP IP/OBS MODERATE 35: CPT | Performed by: NURSE PRACTITIONER

## 2020-08-13 PROCEDURE — 80048 BASIC METABOLIC PNL TOTAL CA: CPT | Performed by: NURSE PRACTITIONER

## 2020-08-13 PROCEDURE — 82550 ASSAY OF CK (CPK): CPT | Performed by: NURSE PRACTITIONER

## 2020-08-13 PROCEDURE — 82553 CREATINE MB FRACTION: CPT | Performed by: NURSE PRACTITIONER

## 2020-08-13 RX ORDER — LANOLIN ALCOHOL/MO/W.PET/CERES
3 CREAM (GRAM) TOPICAL
Status: DISCONTINUED | OUTPATIENT
Start: 2020-08-13 | End: 2020-08-14 | Stop reason: HOSPADM

## 2020-08-13 RX ADMIN — MELATONIN 3 MG: at 21:14

## 2020-08-13 RX ADMIN — SODIUM CHLORIDE 250 ML/HR: 0.9 INJECTION, SOLUTION INTRAVENOUS at 01:34

## 2020-08-13 RX ADMIN — TRAZODONE HYDROCHLORIDE 50 MG: 50 TABLET ORAL at 21:14

## 2020-08-13 RX ADMIN — SODIUM CHLORIDE 250 ML/HR: 0.9 INJECTION, SOLUTION INTRAVENOUS at 05:52

## 2020-08-13 RX ADMIN — HYDROXYZINE HYDROCHLORIDE 50 MG: 25 TABLET, FILM COATED ORAL at 10:46

## 2020-08-13 RX ADMIN — SODIUM CHLORIDE 250 ML/HR: 0.9 INJECTION, SOLUTION INTRAVENOUS at 16:38

## 2020-08-13 RX ADMIN — ACETAMINOPHEN 650 MG: 325 TABLET, FILM COATED ORAL at 07:53

## 2020-08-13 RX ADMIN — POLYETHYLENE GLYCOL 3350 17 G: 17 POWDER, FOR SOLUTION ORAL at 07:53

## 2020-08-13 NOTE — ASSESSMENT & PLAN NOTE
Per patient's familly, she has not been taking her medication as she got busy starting a new job and was trying to "manage her own medications"  Patient was severely agitated and aggressive on admission - required police to bring her to the ED  · She was seen by Crisis and telepsych - she has been committed   · Continue Haldol 2 5 mg every 6 hours as needed for agitation  · Continue Atarax 50 mg every 8 hours as needed for anxiety  · Patient had period of anxiety on 08/12, did trial 1 dose of Ativan 0 5 mg p o  Which did help patient    · Counseled patient on other strategies such as aroma therapy, counseling, stress reduction through calming music and meditation  · Monitor electrolytes; within normal limits  · Continue one-to-one for safety  · Consult crisis when patient is medically stable

## 2020-08-13 NOTE — PLAN OF CARE
Problem: Potential for Falls  Goal: Patient will remain free of falls  Description: INTERVENTIONS:  - Assess patient frequently for physical needs  -  Identify cognitive and physical deficits and behaviors that affect risk of falls  -  [place]on fall precautions as indicated by assessment   - Educate patient/family on patient safety including physical limitations  - Instruct patient to call for assistance with activity based on assessment  - Modify environment to reduce risk of injury  - Consider OT/PT consult to assist with strengthening/mobility  Outcome: Progressing     Problem: DISCHARGE PLANNING  Goal: Discharge to home or other facility with appropriate resources  Description: INTERVENTIONS:  - Identify barriers to discharge w/patient and caregiver  - Arrange for needed discharge resources and transportation as appropriate  - Identify discharge learning needs   - Arrange for interpretive services to assist at discharge as needed    Outcome: Progressing     Problem: Knowledge Deficit  Goal: Patient/family/caregiver demonstrates understanding of disease process, treatment plan, medications, and discharge instructions  Description: Complete learning assessment and assess knowledge base    Interventions:  - Provide teaching at level of understanding  - Provide teaching via preferred learning methods  Outcome: Progressing     Problem: METABOLIC, FLUID AND ELECTROLYTES - ADULT  Goal: Electrolytes maintained within normal limits  Description: INTERVENTIONS:  - Monitor labs and assess patient for signs and symptoms of electrolyte imbalances  - Administer electrolyte replacement as ordered  - Monitor response to electrolyte replacements, including repeat lab results as appropriate  -   Outcome: Progressing  Goal: Fluid balance maintained  Description: INTERVENTIONS:  - Monitor labs   - Instruct patient on fluid and nutrition as appropriate  - Assess for signs & symptoms of volume excess or deficit  Outcome: Progressing

## 2020-08-13 NOTE — PROGRESS NOTES
Progress Note - Eloise Alfaro 2000, 23 y o  female MRN: [id number]    Unit/Bed#: [address] [place] [address] Encounter: [id number]    Primary Care Provider: Shannon Horton DO   Date and time admitted to hospital: 8/7/2020 11:19 AM        * Rhabdomyolysis  Assessment & Plan  CK on 8/7 307  CK on 8/9 2,073  CK on 8/10 3,054 -> decision was made to admit to general medicine due to rising CK  · CK level worsened to 3,600 despite NS at 200 mL/hr  · Increased IVF to NS at 250 mL/hr; repeat CK 2157->1127  · Continue IV fluids at 250 cc/hour  · Encourage oral hydration  · Repeat CK in a m  Abnormal urinalysis  Assessment & Plan  Urinalysis abnormal with innumerable bacteria, moderate epithelial cells, and 4-10 wbc's  · Suspect this was not a clean-catch  · Follow-up new urine culture; no growth  · Patient demonstrates no urinary symptoms  Schizoaffective disorder [facility])  Assessment & Plan  Per patient's familly, she has not been taking her medication as she got busy starting a new job and was trying to "manage her own medications"  Patient was severely agitated and aggressive on admission - required police to bring her to the ED  · She was seen by Crisis and telepsych - she has been committed   · Continue Haldol 2 5 mg every 6 hours as needed for agitation  · Continue Atarax 50 mg every 8 hours as needed for anxiety  · Patient had period of anxiety on 08/12, did trial 1 dose of Ativan 0 5 mg p o  Which did help patient    · Counseled patient on other strategies such as aroma therapy, counseling, stress reduction through calming music and meditation  · Monitor electrolytes; within normal limits  · Continue one-to-one for safety  · Consult crisis when patient is medically stable     Tobacco abuse  Assessment & Plan  Educated on smoking cessation  · Nicotine patch           VTE Pharmacologic Prophylaxis:   Pharmacologic: Low risk  Mechanical VTE Prophylaxis in Place: No    Patient Centered Rounds: I have performed bedside rounds with nursing staff today  Discussions with Specialists or Other Care Team Provider:  Attending and Case Management  Education and Discussions with Family / Patient:  I spoke with the patient at the bedside, I have answered all questions to the best of my abilities  Update provided to patient's mother via phone  Time Spent for Care: 30 minutes  More than 50% of total time spent on counseling and coordination of care as described above  Current Length of Stay: 3 day(s)    Current Patient Status: Inpatient   Certification Statement: The patient will continue to require additional inpatient hospital stay due to Continued IV fluids, checking of CK, will require restraining by crisis when medically stable  Discharge Plan:  Most likely stable for discharge in the next 24 hours pending patient's lab work  Code Status: Level 1 - Full Code      Subjective:   Patient seen ambulating in room  States that she will slept well last night  Good appetite  Feels calmer  Denies headache, dizziness, nausea, vomiting or diarrhea  Objective:     Vitals:   Temp (24hrs), Av 9 °F (36 6 °C), Min:97 8 °F (36 6 °C), Max:98 2 °F (36 8 °C)    Temp:  [97 8 °F (36 6 °C)-98 2 °F (36 8 °C)] 97 8 °F (36 6 °C)  HR:  [45-68] 58  Resp:  [17-18] 17  BP: (118-137)/(65-92) 135/92  SpO2:  [100 %] 100 %  Body mass index is 22 47 kg/m²  Input and Output Summary (last 24 hours): Intake/Output Summary (Last 24 hours) at 2020 1654  Last data filed at 2020 1638  Gross per 24 hour   Intake 9805 ml   Output 4400 ml   Net 5405 ml       Physical Exam:     Physical Exam  Vitals signs and nursing note reviewed  Constitutional:       Appearance: Normal appearance  HENT:      Head: Normocephalic  Nose: Nose normal       Mouth/Throat:      Mouth: Mucous membranes are moist    Eyes:      Extraocular Movements: Extraocular movements intact        Conjunctiva/sclera: Conjunctivae normal       Pupils: Pupils are equal, round, and reactive to light  Neck:      Musculoskeletal: Normal range of motion  Cardiovascular:      Rate and Rhythm: Normal rate and regular rhythm  Pulmonary:      Effort: Pulmonary effort is normal       Breath sounds: Normal breath sounds  Abdominal:      General: Abdomen is flat  Bowel sounds are normal       Palpations: Abdomen is soft  Genitourinary:     Comments: Voiding spontaneously  Musculoskeletal: Normal range of motion  Skin:     General: Skin is warm and dry  Capillary Refill: Capillary refill takes less than 2 seconds  Neurological:      Mental Status: She is alert and oriented to person, place, and time  Psychiatric:         Mood and Affect: Mood normal          Behavior: Behavior normal          Thought Content: Thought content normal          Judgment: Judgment normal          Additional Data:     Labs:    Results from last 7 days   Lab Units 08/11/20  0535   WBC Thousand/uL 5 45   HEMOGLOBIN g/dL 12 2   HEMATOCRIT % 35 9   PLATELETS Thousands/uL 242   NEUTROS PCT % 36*   LYMPHS PCT % 47*   MONOS PCT % 12   EOS PCT % 3     Results from last 7 days   Lab Units 08/13/20  0431  08/11/20  0535   POTASSIUM mmol/L 4 0   < > 3 6   CHLORIDE mmol/L 106   < > 107   CO2 mmol/L 28   < > 27   BUN mg/dL 8   < > 3*   CREATININE mg/dL 0 91   < > 0 86   CALCIUM mg/dL 8 3   < > 8 2*   ALK PHOS U/L  --   --  48   ALT U/L  --   --  44   AST U/L  --   --  79*    < > = values in this interval not displayed  * I Have Reviewed All Lab Data Listed Above  * Additional Pertinent Lab Tests Reviewed:  All Labs Within Last 24 Hours Reviewed    Imaging:    Imaging Reports Reviewed Today Include:  None  Imaging Personally Reviewed by Myself Includes:  None    Recent Cultures (last 7 days):     Results from last 7 days   Lab Units 08/10/20  1349   URINE CULTURE  No Growth <1000 cfu/mL       Last 24 Hours Medication List:   Current Facility-Administered Medications   Medication Dose Route Frequency Provider Last Rate    acetaminophen  650 mg Oral Q6H PRN Silviano Leopard, CRNP      benztropine  1 mg Oral Q6H PRN Silviano Leopard, CRNP      haloperidol  2 5 mg Oral Q6H PRN Silviano Leopard, CRNP      haloperidol lactate  2 5 mg Intramuscular Q6H PRN Silviano Leopard, CRNP      hydrOXYzine HCL  50 mg Oral Q8H PRN Silviano Leopard, CRNP      nicotine  1 patch Transdermal Daily Silviano Leopard, CRNP      ondansetron  4 mg Intravenous Q6H PRN Silviano Leopard, CRNP      polyethylene glycol  17 g Oral Daily PRN Silviano Leopard, CRNP      sodium chloride  250 mL/hr Intravenous Continuous Silviano Leopard, CRNP 250 mL/hr (08/13/20 1638)    traZODone  50 mg Oral HS Silviano Leopard, CRNP          Today, Patient Was Seen By: Tiburcio Kehr, CRNP    ** Please Note: Dictation voice to text software may have been used in the creation of this document   **

## 2020-08-13 NOTE — PLAN OF CARE
Pt c/o of increase anxiety  Noted tapping foot and tremors on B/L arms  Pt encourage to take Atarax PO  Pt took Atarax at 1040  Pt stated that She is "skeptical about the care in the hospital and that she read in twitter that IVs explode  Pt encourage to elaborated on how IVs explode  Pt pointed at IV machine and said "the machine, I don't know"  Dad visiting and at bedside  Patient encourage by dad to have a break off IV fluids for 1 hour  ERNESTO [facility]ist group made aware  As Per ERNESTO [facility] Hospitalist  Ok to keep IV fluids off for 1 hour

## 2020-08-13 NOTE — UTILIZATION REVIEW
Continued Stay Review    Date: 8/13/20                          Current Patient Class: inpatient    Current Level of Care: acute    Assessment/Plan: rhabdomyolysis persistent elevated CK today 1127 continue IVF @250 encourage oral intake repeat CK in am  Abnormal ua f/u new urine cx   Schizoaffective disorder continue 1:1 ,consult crisis when pt medically stable  Certification Statement: The patient will continue to require additional inpatient hospital stay due to Continued IV fluids, checking of CK, will require restraining by crisis when medically stable    Pertinent Labs/Diagnostic Results:   Results from last 7 days   Lab Units 08/07/20  1151   SARS-COV-2  Negative     Results from last 7 days   Lab Units 08/11/20  0535 08/07/20  1149   WBC Thousand/uL 5 45 10 65*   HEMOGLOBIN g/dL 12 2 12 7   HEMATOCRIT % 35 9 36 0   PLATELETS Thousands/uL 242 277   NEUTROS ABS Thousands/µL 1 96 9 12*     Results from last 7 days   Lab Units 08/13/20  0431 08/12/20  0444 08/11/20  0535 08/09/20  0750 08/07/20  1523   SODIUM mmol/L 139 140 140 139 140   POTASSIUM mmol/L 4 0 3 9 3 6 3 8 4 0   CHLORIDE mmol/L 106 109* 107 104 105   CO2 mmol/L 28 25 27 21 27   ANION GAP mmol/L 5 6 6 14* 8   BUN mg/dL 8 6 3* 9 10   CREATININE mg/dL 0 91 0 95 0 86 1 18 0 89   EGFR ml/min/1 73sq m 104 98 111 76 106   CALCIUM mg/dL 8 3 7 9* 8 2* 8 9 8 8   MAGNESIUM mg/dL  --   --  1 7  --   --    PHOSPHORUS mg/dL  --   --  3 5  --   --      Results from last 7 days   Lab Units 08/11/20  0535 08/07/20  1523 08/07/20  1149   AST U/L 79* 30 21   ALT U/L 44 26 21   ALK PHOS U/L 48 44* 46   TOTAL PROTEIN g/dL 7 3 7 4 7 9   ALBUMIN g/dL 3 7 3 8 3 9   TOTAL BILIRUBIN mg/dL 0 40 0 80 0 60     Results from last 7 days   Lab Units 08/13/20  0431 08/12/20  0444 08/11/20  0535 08/09/20  0750 08/07/20  1523 08/07/20  1149   GLUCOSE RANDOM mg/dL 91 85 89 118 89 170*     Results from last 7 days   Lab Units 08/13/20  0431 08/12/20  0444 08/11/20  1425   CK TOTAL U/L 1,127* 2,157* 4,123*   CK MB INDEX % <1 0 <1 0 <1 0   CK MB ng/mL 0 7 0 7 1 1     Vital Signs:   08/13/20 07:48:22   98 2 °F (36 8 °C)   60   18   137/91   106   100 %   None (Room air)   Sitting    08/13/20 01:34:59   97 8 °F (36 6 °C)   45Abnormal     18   125/83   97   100 %            gmf  1:1 observation  Medications:   Scheduled Medications:  nicotine, 1 patch, Transdermal, Daily  traZODone, 50 mg, Oral, HS      Continuous IV Infusions:  sodium chloride, 250 mL/hr, Intravenous, Continuous      PRN Meds:  acetaminophen, 650 mg, Oral, Q6H PRN  benztropine, 1 mg, Oral, Q6H PRN  haloperidol, 2 5 mg, Oral, Q6H PRN  haloperidol lactate, 2 5 mg, Intramuscular, Q6H PRN  hydrOXYzine HCL, 50 mg, Oral, Q8H PRN  ondansetron, 4 mg, Intravenous, Q6H PRN  polyethylene glycol, 17 g, Oral, Daily PRN        Discharge Plan: d    Network Utilization Review Department  [email or web address]  ATTENTION: Please call with any questions or concerns to [phone] and carefully listen to the prompts so that you are directed to the right person  All voicemails are confidential   Naz Rocha all requests for admission clinical reviews, approved or denied determinations and any other requests to dedicated fax number below belonging to the campus where the patient is receiving treatment   List of dedicated fax numbers for the Facilities:  FACILITY NAME UR FAX NUMBER   ADMISSION DENIALS (Administrative/Medical Necessity) [phone]   [address]  (Maternity/NICU/Pediatrics) [phone]   Sabi Veronica [phone]   Simba Mealing [phone]   Josie Koroma [phone]   Nata Liao [facility] [address] [facility] [address] [address]  [place] [address] [phone]

## 2020-08-13 NOTE — ASSESSMENT & PLAN NOTE
CK on 8/7 307  CK on 8/9 2,073  CK on 8/10 3,054 -> decision was made to admit to general medicine due to rising CK  · CK level worsened to 3,600 despite NS at 200 mL/hr  · Increased IVF to NS at 250 mL/hr; repeat CK 2157->1127  · Continue IV fluids at 250 cc/hour  · Encourage oral hydration  · Repeat CK in a m

## 2020-08-14 VITALS
HEIGHT: 65 IN | TEMPERATURE: 98.6 F | WEIGHT: 135 LBS | SYSTOLIC BLOOD PRESSURE: 150 MMHG | RESPIRATION RATE: 18 BRPM | DIASTOLIC BLOOD PRESSURE: 90 MMHG | HEART RATE: 55 BPM | BODY MASS INDEX: 22.49 KG/M2 | OXYGEN SATURATION: 100 %

## 2020-08-14 LAB
ANION GAP SERPL CALCULATED.3IONS-SCNC: 6 MMOL/L (ref 4–13)
BUN SERPL-MCNC: 10 MG/DL (ref 5–25)
CALCIUM SERPL-MCNC: 8.2 MG/DL (ref 8.3–10.1)
CHLORIDE SERPL-SCNC: 107 MMOL/L (ref 100–108)
CK MB SERPL-MCNC: 0.4 NG/ML (ref 0–5)
CK MB SERPL-MCNC: <1 % (ref 0–2.5)
CK SERPL-CCNC: 714 U/L (ref 26–192)
CO2 SERPL-SCNC: 26 MMOL/L (ref 21–32)
CREAT SERPL-MCNC: 0.81 MG/DL (ref 0.6–1.3)
GFR SERPL CREATININE-BSD FRML MDRD: 119 ML/MIN/1.73SQ M
GLUCOSE SERPL-MCNC: 91 MG/DL (ref 65–140)
POTASSIUM SERPL-SCNC: 3.9 MMOL/L (ref 3.5–5.3)
SODIUM SERPL-SCNC: 139 MMOL/L (ref 136–145)

## 2020-08-14 PROCEDURE — 82553 CREATINE MB FRACTION: CPT | Performed by: NURSE PRACTITIONER

## 2020-08-14 PROCEDURE — 80048 BASIC METABOLIC PNL TOTAL CA: CPT | Performed by: NURSE PRACTITIONER

## 2020-08-14 PROCEDURE — RECHECK: Performed by: NURSE PRACTITIONER

## 2020-08-14 PROCEDURE — 99238 HOSP IP/OBS DSCHRG MGMT 30/<: CPT | Performed by: NURSE PRACTITIONER

## 2020-08-14 PROCEDURE — 82550 ASSAY OF CK (CPK): CPT | Performed by: NURSE PRACTITIONER

## 2020-08-14 RX ORDER — LORAZEPAM 0.5 MG/1
0.5 TABLET ORAL ONCE
Status: COMPLETED | OUTPATIENT
Start: 2020-08-14 | End: 2020-08-14

## 2020-08-14 RX ADMIN — ACETAMINOPHEN 650 MG: 325 TABLET, FILM COATED ORAL at 06:23

## 2020-08-14 RX ADMIN — SODIUM CHLORIDE 250 ML/HR: 0.9 INJECTION, SOLUTION INTRAVENOUS at 07:55

## 2020-08-14 RX ADMIN — SODIUM CHLORIDE 250 ML/HR: 0.9 INJECTION, SOLUTION INTRAVENOUS at 03:46

## 2020-08-14 RX ADMIN — LORAZEPAM 0.5 MG: 0.5 TABLET ORAL at 12:27

## 2020-08-14 NOTE — CONSULTS
Consultation - Wypravin Keys 23 y o  female MRN: [id number]    Unit/Bed#: [address] [place] [address] Encounter: [id number]      Identifying Data:  23years old black female is admitted at [facility] after she was brought to the hospital emergency room patient was described psychotic paranoid delusional physically aggressive and agitated and she needed the restrained patient needed the medical admission because her CPK was high and now patient is medically clear  Psychiatric consultation is asked for evaluation and recommendation  Patient examined spoke with the nurse history physical medications labs reviewed and noted  Patient is on one-to-one observation for safety  Nurse reports that so far she is not agitated at this time and taking her medications  Patient reports that she came to the hospital because she was paranoid and she was not sleeping she thought that the neighbor is against her and they are mad at her because she was playing loud music  Patient also reports that she is supposed to be taking her medications Haldol trazodone and Cogentin but she was taking only trazodone and she was not taking her other medications patient is trying to minimize but she reported hallucinations  Patient was able to give out the basic background information  Currently patient is under psychiatric care and she is supposed to be taking medications Haldol Cogentin and trazodone  Patient reports that she feels fine and she wants to go home even though patient is paranoid psychotic and she does not have understanding of her behavior and need for the treatment she is minimizing her problems  Discussed with nurse practitioner Mulu Maher patient's nurse and aide who is watching the patient on one-to-one observation  Information obtained  Reviewed her medications    Patient is described paranoid psychotic when she came in here she tried to cut her wrist and I was able to see the abrasion on her right wrist   Patient is minimizing her problems because she wants to go home and she is not fully reliable patient looks paranoid anxious uptight depressed impulsive and unpredictable  Social history patient lives with her parents and 2 sisters she denies smoking she denies abusing alcohol but she smokes pot and patient's drug screen was positive for marijuana and alcohol level was less than 3 patient denies being pregnant she reports that she has boyfriend  She is college graduate in economy and 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  Diagnosis bipolar disorder anxiety    Chief Complaints:  Psychosis paranoia and agitation patient was screaming and yelling and she was very aggressive she needed to restrain in emergency room  Family History: History reviewed  No pertinent family history  Patient denies    Legal History:  Patient denies    Mental Status Exam:  23years old black female is alert awake oriented x3 memory intact affect flat guarded paranoid she gets anxious and nervous at times possibly hallucinating but she is minimizing and denying at this time patient is able to tell me that she is not sleeping and she became sick anxious emotional labile and she was brought to the hospital   She admits being paranoid towards the neighbors she reported that they are mad at her because she was playing music loud etc   Currently patient denies feeling suicidal but patient is impulsive paranoid and she was not taking her medications at home and she became sick again she reports that she was taking only trazodone and it was not helping her and she was not sleeping  Patient is delusional impulsive paranoid guarded severe mood swings psychotic  Poor concentration  Insight and judgments are limited due to her mental condition  She does not have understanding of her behavior and illness she has been noncompliance with the medications        History of Present Illness     HPI: Tyler Greenfield is a 23y o  year old female who presents with psychosis paranoia and agitation    Consults      Historical Information   Past Psychiatric History:  Patient has a long history of mental illness and recently patient was admitted in a psychiatric hospital in June 2020 she has been under psychiatric care at [facility] and she was prescribed medications Haldol Cogentin and trazodone but she has been noncompliance of the treatment  Patient reports that she had at least 1 psychiatric admission for paranoia and not sleeping well and she gives a history of 3 suicide attempts the 1st suicide attempt was when she was 4years old and the last 1 was in November 2019 by taking an overdose  She has been noncompliance with the treatment  Patient has history of drinking alcohol in a college but no DUI and no rehab she smokes marijuana but she denies abusing any other drugs no history of IV drug abuse no rehab and she denies legal problems in the past   Past Medical History:   Diagnosis Date    Murmur, cardiac     Psychiatric disorder      History reviewed  No pertinent surgical history    Social History   Social History     Substance and Sexual Activity   Alcohol Use Yes    Comment: states drank alot tonight     Social History     Substance and Sexual Activity   Drug Use Not on file    Comment: sometimes uses mushrooms     Social History     Tobacco Use   Smoking Status Current Some Day Smoker   Smokeless Tobacco Never Used       Meds/Allergies   current meds:   Current Facility-Administered Medications   Medication Dose Route Frequency    acetaminophen (TYLENOL) tablet 650 mg  650 mg Oral Q6H PRN    benztropine (COGENTIN) tablet 1 mg  1 mg Oral Q6H PRN    haloperidol (HALDOL) tablet 2 5 mg  2 5 mg Oral Q6H PRN    haloperidol lactate (HALDOL) injection 2 5 mg  2 5 mg Intramuscular Q6H PRN    hydrOXYzine HCL (ATARAX) tablet 50 mg  50 mg Oral Q8H PRN    melatonin tablet 3 mg  3 mg Oral HS PRN    nicotine (NICODERM CQ) 7 mg/24hr TD 24 hr patch 1 patch  1 patch Transdermal Daily    ondansetron (ZOFRAN) injection 4 mg  4 mg Intravenous Q6H PRN    polyethylene glycol (MIRALAX) packet 17 g  17 g Oral Daily PRN    sodium chloride 0 9 % infusion  250 mL/hr Intravenous Continuous    traZODone (DESYREL) tablet 50 mg  50 mg Oral HS    and PTA meds:    No medications prior to admission  No Known Allergies    Objective   Vitals: Blood pressure 137/90, pulse 55, temperature 97 6 °F (36 4 °C), resp  rate 17, height 5' 5" (1 651 m), weight 61 2 kg (135 lb), SpO2 100 %  Routine Lab Results:   No results displayed because visit has over 200 results  Diagnosis:  Bipolar disorder with relapse due to non compliance of the medications  Insomnia  Noncompliance of the treatment  Plan:  Continue one-to-one observation for safety and possible escape risk  Transfer to inpatient psychiatric care for further treatment and stabilization  Patient will need outpatient psychiatric follow-up and compliance with the medications to maintain her mental illness after the discharge from the psychiatric hospital   Discussed with nurse practitioner and crisis worker  Nurse reported that mother is unhappy about my recommendation and she wanted to talk to me I called the mother at [phone] and discussed my findings and my recommendation also I explain her the commitment process by the Family [facility] crisis  Crisis worker will arrange the tele psych for the 2nd opinion they will decide whether patient needs commitment or not and patient will need outpatient psychiatric follow-up and comply with her medications      Gemini Castillo MD

## 2020-08-14 NOTE — ED NOTES
8/14/20 @ 6723:  PES notified that patient was trying to leave hospital and parents were going to take her home  PES states that security needs to be called because Dr Robert Salamanca is recommending inpatient treatment, and patient needs to be screened  [address], [address] [place] [address]: PES contacted family guidance center and requested screening, as patient is obviously not voluntary    [address] Méndez, [place]

## 2020-08-14 NOTE — UTILIZATION REVIEW
Continued Stay Review    Date: 8/14/20                          Current Patient Class: inpatient   Current Level of Care: acute  Assessment/Plan: schizoaffective disorder with rhabdomyolysis continue IVF ck trending downwards 714 today   Continues 1:1 for safety observation  Rhabdomyolysis At this time, creatinine has improved to 740 with IV fluids  Medically cleared  Consulting Crisis consult for renewed commitment      PSYCHIATRY CONSULT  Bipolar disorder with relapse due to non compliance of the medications  Insomnia  Noncompliance of the treatment  Plan:  Continue one-to-one observation for safety and possible escape risk  Transfer to inpatient psychiatric care for further treatment and stabilization  Patient will need outpatient psychiatric follow-up and compliance with the medications to maintain her mental illness after the discharge from the psychiatric hospital    Crisis worker will arrange the tele psych for the 2nd opinion they will decide whether patient needs commitment or not and patient will need outpatient psychiatric follow-up and comply with her medications    Crisis consulted  Pertinent Labs/Diagnostic Results:   Results from last 7 days   Lab Units 08/07/20  1151   SARS-COV-2  Negative     Results from last 7 days   Lab Units 08/11/20  0535 08/07/20  1149   WBC Thousand/uL 5 45 10 65*   HEMOGLOBIN g/dL 12 2 12 7   HEMATOCRIT % 35 9 36 0   PLATELETS Thousands/uL 242 277   NEUTROS ABS Thousands/µL 1 96 9 12*     Results from last 7 days   Lab Units 08/14/20  0620 08/13/20  0431 08/12/20  0444 08/11/20  0535 08/09/20  0750   SODIUM mmol/L 139 139 140 140 139   POTASSIUM mmol/L 3 9 4 0 3 9 3 6 3 8   CHLORIDE mmol/L 107 106 109* 107 104   CO2 mmol/L 26 28 25 27 21   ANION GAP mmol/L 6 5 6 6 14*   BUN mg/dL 10 8 6 3* 9   CREATININE mg/dL 0 81 0 91 0 95 0 86 1 18   EGFR ml/min/1 73sq m 119 104 98 111 76   CALCIUM mg/dL 8 2* 8 3 7 9* 8 2* 8 9   MAGNESIUM mg/dL  --   --   --  1 7  --    PHOSPHORUS mg/dL --   --   --  3 5  --      Results from last 7 days   Lab Units 08/11/20  0535 08/07/20  1523 08/07/20  1149   AST U/L 79* 30 21   ALT U/L 44 26 21   ALK PHOS U/L 48 44* 46   TOTAL PROTEIN g/dL 7 3 7 4 7 9   ALBUMIN g/dL 3 7 3 8 3 9   TOTAL BILIRUBIN mg/dL 0 40 0 80 0 60     Results from last 7 days   Lab Units 08/14/20  0620 08/13/20  0431 08/12/20  0444 08/11/20  0535 08/09/20  0750 08/07/20  1523 08/07/20  1149   GLUCOSE RANDOM mg/dL 91 91 85 89 118 89 170*     Results from last 7 days   Lab Units 08/14/20  0620 08/13/20  0431 08/12/20  0444   CK TOTAL U/L 714* 1,127* 2,157*   CK MB INDEX % <1 0 <1 0 <1 0   CK MB ng/mL 0 4 0 7 0 7     Results from last 7 days   Lab Units 08/08/20  0608   CLARITY UA  Slightly Cloudy   COLOR UA  Yellow   SPEC GRAV UA  <=1 005   PH UA  6 0   GLUCOSE UA mg/dl Negative   KETONES UA mg/dl 15 (1+)*   BLOOD UA  Small*   PROTEIN UA mg/dl Negative   NITRITE UA  Negative   BILIRUBIN UA  Negative   UROBILINOGEN UA E U /dl 0 2   LEUKOCYTES UA  Negative   WBC UA /hpf 4-10*   RBC UA /hpf 1-2*   BACTERIA UA /hpf Innumerable*   EPITHELIAL CELLS WET PREP /hpf Moderate*     Results from last 7 days   Lab Units 08/08/20  0608   AMPH/METH  Negative   BARBITURATE UR  Negative   BENZODIAZEPINE UR  Negative   COCAINE UR  Negative   METHADONE URINE  Negative   OPIATE UR  Negative   PCP UR  Negative   THC UR  Positive*     Results from last 7 days   Lab Units 08/07/20  1523 08/07/20  1149   ETHANOL LVL mg/dL  --  <3   ACETAMINOPHEN LVL ug/mL <2*  --    SALICYLATE LVL mg/dL <3*  --      Results from last 7 days   Lab Units 08/10/20  1349   URINE CULTURE  No Growth <1000 cfu/mL       Vital Signs:   08/14/20 09:13:52   97 6 °F (36 4 °C)   --   --   137/90   106   --   --   --    08/14/20 09:12:13   97 6 °F (36 4 °C)   --   --   148/85   106   --   --   --    08/14/20 03:48:17   97 9 °F (36 6 °C)   --   17   120/80   93              1:1 observation    Medications:   Scheduled Medications:  nicotine, 1 patch, Transdermal, Daily  traZODone, 50 mg, Oral, HS      Continuous IV Infusions:  sodium chloride, 250 mL/hr, Intravenous, Continuous      PRN Meds:  acetaminophen, 650 mg, Oral, Q6H PRN  benztropine, 1 mg, Oral, Q6H PRN  haloperidol, 2 5 mg, Oral, Q6H PRN  haloperidol lactate, 2 5 mg, Intramuscular, Q6H PRN  hydrOXYzine HCL, 50 mg, Oral, Q8H PRN  melatonin, 3 mg, Oral, HS PRN  ondansetron, 4 mg, Intravenous, Q6H PRN  polyethylene glycol, 17 g, Oral, Daily PRN        Discharge Plan: d    Network Utilization Review Department  [email or web address]  ATTENTION: Please call with any questions or concerns to [phone] and carefully listen to the prompts so that you are directed to the right person  All voicemails are confidential   Malena Alcantara all requests for admission clinical reviews, approved or denied determinations and any other requests to dedicated fax number below belonging to the campus where the patient is receiving treatment   List of dedicated fax numbers for the Facilities:  [address] DENIALS (Administrative/Medical Necessity) [phone]   [address] [place] [address] (Maternity/NICU/Pediatrics) [phone]   Jennifer Sen [phone]   [facility] [phone]   Zina Andrade [phone]   145 [facility]  [phone]   1205 [facility] [address] [place] [address] [phone]   [facility] [phone]   [address] [phone]

## 2020-08-14 NOTE — PROGRESS NOTES
Progress Note - Eloise Alfaro 2000, 23 y o  female MRN: [id number]    Unit/Bed#: [address] Encounter: [id number]    Primary Care Provider: Jemal Coy DO   Date and time admitted to hospital: 8/7/2020 11:19 AM      Patient is medically stable  Consult crisis  * Rhabdomyolysis  Assessment & Plan  CK on 8/7 307  CK on 8/9 2,073  CK on 8/10 3,054 -> decision was made to admit to general medicine due to rising CK  · At this time, creatinine has improved to 740 with IV fluids  · Patient is medically stable for discharge as she is taking in adequate oral hydration    Schizoaffective disorder [facility])  Assessment & Plan  Per patient's familly, she has not been taking her medication as she got busy starting a new job and was trying to "manage her own medications"  Patient was severely agitated and aggressive on admission - required police to bring her to the ED  · She was seen by Crisis and telepsych - she has been committed   · Continue Haldol 2 5 mg every 6 hours as needed for agitation  · Continue Atarax 50 mg every 8 hours as needed for anxiety  · Patient had period of anxiety on 08/12, did trial 1 dose of Ativan 0 5 mg po which did help patient  · Patient is requesting an additional dose of Ativan 0 5 mg po x1 now due to increasing anxiety   · Counseled patient on other strategies such as aroma therapy, counseling, stress reduction through calming music and meditation  · Continue one-to-one for safety  · Will consult crisis as patient is medically stable today    Abnormal urinalysis  Assessment & Plan  Urinalysis abnormal with innumerable bacteria, moderate epithelial cells, and 4-10 wbc's  · Urine culture showed no growth  No indication for antibiotics      Tobacco abuse  Assessment & Plan  Educated on smoking cessation  · Nicotine patch     VTE Pharmacologic Prophylaxis:   Pharmacologic: Enoxaparin (Lovenox)  Mechanical VTE Prophylaxis in Place: Yes    Patient Centered Rounds: I have performed bedside rounds with nursing staff today  Discussions with Specialists or Other Care Team Provider: nursing, CM, psychiatry, crisis, nurse manager, nursing supervisor    Local police department was called for assistance as patient's mother and father wanted to take her home despite her being committed to inpatient psychiatry  The police will escort her parents out of the building at this time  Patient will be re-evaluated by family guidance and tele psych this afternoon/evening  Education and Discussions with Family / Patient: I have answered all questions to the best of my ability  Extensive conversation with mother and father at bedside  Time Spent for Care: 1 hour  More than 50% of total time spent on counseling and coordination of care as described above  Current Length of Stay: 4 day(s)    Current Patient Status: Inpatient   Certification Statement: The patient will continue to require additional inpatient hospital stay due to Needs crisis evaluation    Discharge Plan: Patient is not medically stable for discharge today  Will consult crisis today  Code Status: Level 1 - Full Code      Subjective:   Resting comfortably in bed  Denies any headache, dizziness, lightheadedness, chest pain, shortness of breath  Denies any muscle aches or pains  States she is ambulating to the bathroom frequently  She desires to be discharged home with her family and her [de-identified]  She states her mom has made an outpatient appointment with her psychiatrist and her therapist     Her mother and father are aware that psychiatrist, Dr Tye Montes, is still recommending inpatient psychiatric care after discharge from the hospital   Patient's mother and father are demanding that they take her home from the hospital   Nurse manager, nurse supervisor, and hospital administration aware of the situation  Nurse manager has called the local police for assistance    At this time, the police will assist the family out of the hospital   Patient will be re-evaluated by family guidance and tele psych this afternoon to determine whether she needs inpatient psychiatric care or if she can be cleared for discharge home  Objective:     Vitals:   Temp (24hrs), Av 7 °F (36 5 °C), Min:97 4 °F (36 3 °C), Max:97 9 °F (36 6 °C)    Temp:  [97 4 °F (36 3 °C)-97 9 °F (36 6 °C)] 97 6 °F (36 4 °C)  HR:  [55-58] 55  Resp:  [17-18] 17  BP: (120-148)/(80-92) 137/90  SpO2:  [100 %] 100 %  Body mass index is 22 47 kg/m²  Input and Output Summary (last 24 hours): Intake/Output Summary (Last 24 hours) at 2020 1158  Last data filed at 2020 0900  Gross per 24 hour   Intake 4185 84 ml   Output 2900 ml   Net 1285 84 ml       Physical Exam:     Physical Exam  Vitals signs and nursing note reviewed  Constitutional:       General: She is not in acute distress  Appearance: She is well-developed  She is not diaphoretic  HENT:      Head: Normocephalic  Neck:      Musculoskeletal: Normal range of motion  Cardiovascular:      Rate and Rhythm: Normal rate and regular rhythm  Pulmonary:      Effort: Pulmonary effort is normal  No respiratory distress  Breath sounds: Normal breath sounds  No wheezing, rhonchi or rales  Abdominal:      General: Bowel sounds are normal  There is no distension  Palpations: Abdomen is soft  Tenderness: There is no abdominal tenderness  Musculoskeletal: Normal range of motion  General: No tenderness  Skin:     General: Skin is warm and dry  Findings: No rash  Neurological:      Mental Status: She is alert and oriented to person, place, and time  Deep Tendon Reflexes: Reflexes are normal and symmetric  Psychiatric:         Mood and Affect: Mood is anxious           Speech: Speech normal          Behavior: Behavior normal          Judgment: Judgment normal          Additional Data:     Labs:    Results from last 7 days   Lab Units 20  0535   WBC Thousand/uL 5 45 HEMOGLOBIN g/dL 12 2   HEMATOCRIT % 35 9   PLATELETS Thousands/uL 242   NEUTROS PCT % 36*   LYMPHS PCT % 47*   MONOS PCT % 12   EOS PCT % 3     Results from last 7 days   Lab Units 08/14/20  0620  08/11/20  0535   POTASSIUM mmol/L 3 9   < > 3 6   CHLORIDE mmol/L 107   < > 107   CO2 mmol/L 26   < > 27   BUN mg/dL 10   < > 3*   CREATININE mg/dL 0 81   < > 0 86   CALCIUM mg/dL 8 2*   < > 8 2*   ALK PHOS U/L  --   --  48   ALT U/L  --   --  44   AST U/L  --   --  79*    < > = values in this interval not displayed  * I Have Reviewed All Lab Data Listed Above  * Additional Pertinent Lab Tests Reviewed:  All Labs Within Last 24 Hours Reviewed    Imaging:    Imaging Reports Reviewed Today Include: None  Imaging Personally Reviewed by Myself Includes:  None     Recent Cultures (last 7 days):     Results from last 7 days   Lab Units 08/10/20  1349   URINE CULTURE  No Growth <1000 cfu/mL       Last 24 Hours Medication List:   Current Facility-Administered Medications   Medication Dose Route Frequency Provider Last Rate    acetaminophen  650 mg Oral Q6H PRN Couderay Shape, CRNP      benztropine  1 mg Oral Q6H PRN Couderay Shape, CRNP      haloperidol  2 5 mg Oral Q6H PRN Couderay Shape, CRNP      haloperidol lactate  2 5 mg Intramuscular Q6H PRN Couderay Shape, CRNP      hydrOXYzine HCL  50 mg Oral Q8H PRN Couderay Shape, CRNP      melatonin  3 mg Oral HS PRN Deshaun Cap, CRNP      nicotine  1 patch Transdermal Daily Couderay Shape, CRNP      ondansetron  4 mg Intravenous Q6H PRN Couderay Shape, CRNP      polyethylene glycol  17 g Oral Daily PRN Couderay Shape, CRNP      sodium chloride  250 mL/hr Intravenous Continuous Couderay Shape, CRNP 250 mL/hr (08/14/20 0755)    traZODone  50 mg Oral HS Couderay Shape, ERNESTO          Today, Patient Was Seen By: ERNESTO Gusman    ** Please Note: Dictation voice to text software may have been used in the creation of this document   **

## 2020-08-14 NOTE — ASSESSMENT & PLAN NOTE
Per patient's familly, she has not been taking her medication as she got busy starting a new job and was trying to "manage her own medications"  Patient was severely agitated and aggressive on admission - required police to bring her to the ED  · She was seen by Crisis and telepsych - she has been committed   · Continue Haldol 2 5 mg every 6 hours as needed for agitation  · Continue Atarax 50 mg every 8 hours as needed for anxiety  · Patient had period of anxiety on 08/12, did trial 1 dose of Ativan 0 5 mg po which did help patient  · Patient is requesting an additional dose of Ativan 0 5 mg po x1 now due to increasing anxiety   · Counseled patient on other strategies such as aroma therapy, counseling, stress reduction through calming music and meditation  · Continue one-to-one for safety  · Will consult crisis as patient is medically stable today

## 2020-08-14 NOTE — DISCHARGE SUMMARY
Discharge- Iftikhar Del Real 2000, 23 y o  female MRN: [id number]    Unit/Bed#: [address] [place] [address] Encounter: [id number]    Primary Care Provider: Governor Jez DO   Date and time admitted to hospital: 8/7/2020 11:19 AM        Tobacco abuse  Assessment & Plan  Educated on smoking cessation  · Nicotine patch     Schizoaffective disorder ([address] )  Assessment & Plan  Per patient's familly, she has not been taking her medication as she got busy starting a new job and was trying to "manage her own medications"  Patient was severely agitated and aggressive on admission - required police to bring her to the ED  · She was seen by Crisis and telepsych - she has been committed   · Continue Haldol 2 5 mg every 6 hours as needed for agitation  · Continue Atarax 50 mg every 8 hours as needed for anxiety  · Patient had period of anxiety on 08/12, did trial 1 dose of Ativan 0 5 mg po which did help patient  · Patient is requesting an additional dose of Ativan 0 5 mg po x1 now due to increasing anxiety   · Counseled patient on other strategies such as aroma therapy, counseling, stress reduction through calming music and meditation  · Continue one-to-one for safety  · Will consult crisis as patient is medically stable today    Abnormal urinalysis  Assessment & Plan  Urinalysis abnormal with innumerable bacteria, moderate epithelial cells, and 4-10 wbc's  · Urine culture showed no growth  No indication for antibiotics      * Rhabdomyolysis  Assessment & Plan  CK on 8/7 307  CK on 8/9 2,073  CK on 8/10 3,054 -> decision was made to admit to general medicine due to rising CK  · At this time, creatinine has improved to 740 with IV fluids  · Patient is medically stable for discharge as she is taking in adequate oral hydration      Discharging Physician / Practitioner: Arnulfo Mcknezie  PCP: Governor Jez DO  Admission Date: 8/7/2020  Discharge Date: 08/14/20    Reason for Admission: Psychiatric Evaluation (Patient arrived via police for crisis)        Resolved Problems  Date Reviewed: 8/14/2020    None          Consultations During Hospital Stay:  [facility]    Procedures Performed:     · none    Significant Findings / Test Results:     · none  Results from last 7 days   Lab Units 08/11/20  0535   WBC Thousand/uL 5 45   HEMOGLOBIN g/dL 12 2   PLATELETS Thousands/uL 242     Results from last 7 days   Lab Units 08/14/20  0620 08/13/20  0431 08/12/20  0444 08/11/20  0535   SODIUM mmol/L 139 139 140 140   POTASSIUM mmol/L 3 9 4 0 3 9 3 6   CHLORIDE mmol/L 107 106 109* 107   CO2 mmol/L 26 28 25 27   BUN mg/dL 10 8 6 3*   CREATININE mg/dL 0 81 0 91 0 95 0 86   CALCIUM mg/dL 8 2* 8 3 7 9* 8 2*   TOTAL BILIRUBIN mg/dL  --   --   --  0 40   ALK PHOS U/L  --   --   --  48   ALT U/L  --   --   --  44   AST U/L  --   --   --  79*             No results found for: HGBA1C          Blood Culture: No results found for: BLOODCX  Urine Culture:   Lab Results   Component Value Date    URINECX No Growth <1000 cfu/mL 08/10/2020     Sputum Culture: No components found for: SPUTUMCX  Wound Culture: No results found for: WOUNDCULT     Imaging  No orders to display         Incidental Findings:   · none    Test Results Pending at Discharge (will require follow up):   · none     Outpatient Tests Requested:  · none    Complications:  none    Reason for Admission: rhabdomyolysis     Hospital Course:     PER HPI: Stephanie Martinez is a 23 y o  female patient with a PMH of schizoaffective disorder who originally presented to the hospital on 8/7/2020 due to psychiatric decline  Patient was paranoid and aggressive on arival   Patient had to be restrained and ultimately had an elevation in her CK, warranting admission to [facility]  Patient was treated with hydration and cleared from a medical standpoint when rhabdo resolved  Patient was initially seen by crisis and telepsych and committed  Dr Jailene Sue recommended inpatient psychiatric care    Ultimately patient did not want to go to inpatient psych  Consult was obtained with family guidance and patient was cleared for discharge  Conversation was had with the primary team - Stephanie Rogers and Dr Terence Maguire  Essentially, as Dr Myesha Garzon is still recommending inpatient hospitalization, patient will not be cleared from the services standpoint to be discharged  She cannot, however, be kept here any longer if she no longer wishes to receive treatment as she has been cleared by family guidance  Patient was advised that we do recommend she continue her treatment with risks of leaving including psychological decline, permanent disability and death  Patient verbalized understanding of all this and signed against medical advice  This was communicated to team members  Hospital course:   Please see above list of diagnoses and related plan for additional information  Condition at Discharge: stable       Discharge instructions/Information to patient and family:   See after visit summary for information provided to patient and family  Provisions for Follow-Up Care:  See after visit summary for information related to follow-up care and any pertinent home health orders  Disposition:     Home    Planned Readmission: none      Discharge Statement:  I spent 20 minutes discharging the patient  This time was spent on the day of discharge  I had direct contact with the patient on the day of discharge  Greater than 50% of the total time was spent examining patient, answering all patient questions, arranging and discussing plan of care with patient as well as directly providing post-discharge instructions  Additional time then spent on discharge activities  Discharge Medications:  See after visit summary for reconciled discharge medications provided to patient and family        ** Please Note: This note has been constructed using a voice recognition system **

## 2020-08-14 NOTE — ASSESSMENT & PLAN NOTE
Urinalysis abnormal with innumerable bacteria, moderate epithelial cells, and 4-10 wbc's  · Urine culture showed no growth  No indication for antibiotics

## 2020-08-14 NOTE — ASSESSMENT & PLAN NOTE
CK on 8/7 307  CK on 8/9 2,073  CK on 8/10 3,054 -> decision was made to admit to general medicine due to rising CK  · At this time, creatinine has improved to 740 with IV fluids  · Patient is medically stable for discharge as she is taking in adequate oral hydration

## 2020-08-14 NOTE — ED NOTES
8/14/20 @ :  PES notified that patient is medically cleared  PES states that patient requires a Psychiatric consult prior to PES involvement  Ms Devi Willett was notified  PES will await completion of consult, then follow up with Psychiatrist's recommendation  [address] [place], [place] [address]emma Méndez[address] : TEODORO spoke to Dr Lesley Abdi, who recommends inpatient treatment,  Ramy Townsend will meet with patient to determine if she's agreeable to inpatient treatment    [facility] Makenna Méndez MS

## 2020-08-14 NOTE — ED NOTES
Pt screened by Leonora at [facility]  Leonora developed safety plan with pt  Pt is cleared for d/c by [facility]  Pt awaiting print out of safety plan before she calls mom to pick her up  This writer informed pt's nurse and the nursing supervisor that the pt was cleared by [facility] for d/c  Informed that the hospitalist wants the pt to sign an AMA form being that inpt psych treatment was recommended and pt is leaving    This writer will call and speak with hospitalist     Edison Zhao  Crisis Intervention Worker  08/14/20 7:32 PM

## 2020-08-15 NOTE — NURSING NOTE
Late entry: Notified at 1:42pm that Mother and father wants to take pt home now that she is medically cleared  According to Dr Robert Salamanca, patient is committed and requires inpatient treatment  Patent is not voluntary  Security called to standby as the family was verbalizing they will physically remove the patient from the hospital   Carmelita Gonzales called to the room to discuss treatment plan and process with the patient and her mother and father  Patient and her parents feel as though she does not need treatment  Parents state "She has an appointment next Thursday, we will  her medications, she is fine, we are taking her home now, you have no legal right to keep her "  This writer reviewed the case with Dr Robert Salamanca (via phone call with Carmelita Gonzales) and Kirsten Angeles  The patient has committment papers, and cannot leave the hospital at this time  Patient will be screened by [facility], and may leave A if cleared  Police presence was requested as parents were interfering with treatment and escalating patient behaviord threatening to remove the patient from the hospital   [place] police and Kirsten Angeles reviewed the legal process with the parents, and requested they leave the hospital   At this time, nursing requests that patient has no visitors as they are interfering with her care and there is concern for her safety and well being      Reviewed case with Malinda Rodriguez, [address] [place] [address], Manager, Patient Care Services

## 2020-08-17 NOTE — UTILIZATION REVIEW
Notification of Discharge  This is a Notification of Discharge from our facility [address]  Please be advised that this patient has been discharge from our facility  Below you will find the admission and discharge date and time including the patients disposition  PRESENTATION DATE: 8/7/2020 11:19 AM  OBS ADMISSION DATE:   IP ADMISSION DATE: 8/10/20 1405   DISCHARGE DATE: 8/14/2020  8:58 PM  DISPOSITION: Left against medical advice or discontinued care Left against medical advice or discontinued care   Admission Orders listed below:  Admission Orders (From admission, onward)     Ordered        08/10/20 1405  Inpatient Admission  Once                   Please contact the UR Department if additional information is required to close this patient's authorization/case  [facility] Utilization Review Department  Main: [phone] x carefully listen to the prompts  All voicemails are confidential   [email or web address]  Send all requests for admission clinical reviews, approved or denied determinations and any other requests to dedicated fax number below belonging to the campus where the patient is receiving treatment   List of dedicated fax numbers:  [address] DENIALS (Administrative/Medical Necessity) [phone]   [address] (Maternity/NICU/Pediatrics) [phone]   Jennifer Sen [phone]   [facility] [phone]   Zina Andrade [phone]   Montyenann Deal [facility] [address] [place] [address] [phone]   [facility]  [phone]   [address] [facility] [address] [facility]  [address] [address] [address] [facility] [phone]

## 2020-09-25 NOTE — ED NOTES
Pt waiting to be telepsych  Pt calm and cooperative at this time   Pt on continuous 1:1      Honorio Valentino RN  08/08/20 0049 patient, surgery team, ICU

## 2020-11-19 ENCOUNTER — OFFICE VISIT (OUTPATIENT)
Dept: FAMILY MEDICINE CLINIC | Facility: CLINIC | Age: 20
End: 2020-11-19
Payer: COMMERCIAL

## 2020-11-19 ENCOUNTER — TELEPHONE (OUTPATIENT)
Dept: ADMINISTRATIVE | Facility: OTHER | Age: 20
End: 2020-11-19

## 2020-11-19 VITALS
HEIGHT: 65 IN | RESPIRATION RATE: 16 BRPM | HEART RATE: 92 BPM | TEMPERATURE: 97 F | BODY MASS INDEX: 24.83 KG/M2 | DIASTOLIC BLOOD PRESSURE: 62 MMHG | SYSTOLIC BLOOD PRESSURE: 116 MMHG | WEIGHT: 149 LBS

## 2020-11-19 DIAGNOSIS — F31.9 BIPOLAR 1 DISORDER (HCC): Primary | ICD-10-CM

## 2020-11-19 DIAGNOSIS — Z00.00 WELL ADULT EXAM: ICD-10-CM

## 2020-11-19 DIAGNOSIS — Z23 NEED FOR VACCINATION: ICD-10-CM

## 2020-11-19 PROCEDURE — 90715 TDAP VACCINE 7 YRS/> IM: CPT

## 2020-11-19 PROCEDURE — 99395 PREV VISIT EST AGE 18-39: CPT | Performed by: FAMILY MEDICINE

## 2020-11-19 PROCEDURE — 90471 IMMUNIZATION ADMIN: CPT

## 2020-11-19 RX ORDER — MULTIVIT-MIN/IRON FUM/FOLIC AC 7.5 MG-4
1 TABLET ORAL DAILY
COMMUNITY

## 2020-11-19 RX ORDER — TRAZODONE HYDROCHLORIDE 100 MG/1
100 TABLET ORAL DAILY
COMMUNITY
Start: 2020-11-06 | End: 2020-11-19 | Stop reason: SDUPTHER

## 2020-11-19 RX ORDER — TRAZODONE HYDROCHLORIDE 100 MG/1
100 TABLET ORAL DAILY
Qty: 30 TABLET | Refills: 0 | Status: SHIPPED | OUTPATIENT
Start: 2020-11-19 | End: 2020-12-19

## 2020-11-19 RX ORDER — ARIPIPRAZOLE 10 MG/1
10 TABLET ORAL DAILY
COMMUNITY
Start: 2020-11-06 | End: 2020-11-19 | Stop reason: SDUPTHER

## 2020-11-19 RX ORDER — ARIPIPRAZOLE 10 MG/1
10 TABLET ORAL DAILY
Qty: 30 TABLET | Refills: 0 | Status: SHIPPED | OUTPATIENT
Start: 2020-11-19 | End: 2020-12-19

## 2020-11-27 ENCOUNTER — TELEPHONE (OUTPATIENT)
Dept: PSYCHIATRY | Facility: CLINIC | Age: 20
End: 2020-11-27

## 2020-12-01 ENCOUNTER — TELEPHONE (OUTPATIENT)
Dept: PSYCHIATRY | Facility: CLINIC | Age: 20
End: 2020-12-01

## 2020-12-01 NOTE — TELEPHONE ENCOUNTER
Behavorial Health Outpatient Intake Questions    Referred by: Self    Please advised interviewee that they need to answer all questions truthfully to allow for best care and any misrepresentations of information may affect their ability to be seen at this clinic   => Was this discussed? Yes     Behavorial Health Outpatient Intake History -     Presenting Problem (in patient's words): In the beginning of summer patient just graduated school and experienced her first breakdown  Diagnosed with Bi-Polar Depression  Patient also suffers from Anxiety - and has panic attacks  Wants to stabilize her mood and make sure she is on the correct medications  Trouble focusing  Patient has had a total of three inpatient hospitalization stays  Are there any developmental disabilities? ? If yes, can they speak to you on the phone? If they are too limited to speak to you on phone, refer out No    Are you taking any psychiatric medications? Yes    => If yes, who prescribes? If yes, are they injectable medications? Trazodone and Abilify - prescribed by PCP     Does the patient have a language barrier or hearing impairment? No    Have you been treated at [facility] by a therapist or a doctor in the past? If yes, who? No    Has the patient been hospitalized for mental health? Yes   If yes, how long ago was last hospitalization and where was it? About a month ago in [facility] for about a week     Do you actively use alcohol or marijuana or illegal substances? If yes, what and how much - refer out to Drug and alcohol treatment if use is excessive or daily use of illegal substances No concerns of substance abuse are reported  Do you have a community treatment team or ? No    Legal History-     Does the patient have any history of arrests, longterm/FPC time, or DUIs? No  If Yes-  1) What types of charges? 2) When were they last incarcerated? 3) Are they currently on parole or probation?     Minor Child-    Who has custody of the child? Is there a custody agreement? If there is a custody agreement remind parent that they must bring a copy to the first appt or they will not be seen  Intake Team, please check with provider before scheduling if flags come up such as:  - complex case  - legal history (other than DUI)  - communication barrier concerns are present  - if, in your judgment, this needs further review    ACCEPTED as a patient Yes  => Appointment Date: Monday, January 18th at 2:30pm with Dr Rebecca Desai? Yes    Name of Insurance Co: [address] ID# [id number]  Insurance Phone #  If ins is primary or secondary [address] [place] [address]: [id number]  If patient is a minor, parents information such as Name, D  O B of guarantor

## 2021-01-27 ENCOUNTER — TELEPHONE (OUTPATIENT)
Dept: SURGERY | Facility: CLINIC | Age: 21
End: 2021-01-27

## 2021-01-27 NOTE — TELEPHONE ENCOUNTER
Patient called back and was prescreened for covid, she is experiencing headaches , she recently traveled to [place]

## 2021-04-21 ENCOUNTER — TELEMEDICINE (OUTPATIENT)
Dept: FAMILY MEDICINE CLINIC | Facility: CLINIC | Age: 21
End: 2021-04-21
Payer: COMMERCIAL

## 2021-04-21 DIAGNOSIS — Z20.822 EXPOSURE TO COVID-19 VIRUS: Primary | ICD-10-CM

## 2021-04-21 PROCEDURE — G2012 BRIEF CHECK IN BY MD/QHP: HCPCS | Performed by: FAMILY MEDICINE

## 2021-04-21 NOTE — PROGRESS NOTES
COVID-19 Outpatient Progress Note    Assessment/Plan:    Problem List Items Addressed This Visit     None      Visit Diagnoses     Exposure to COVID-19 virus    -  Primary    Relevant Orders    Novel Coronavirus (Covid-19),PCR [facility] - Collected at   [facility] or [facility]         Disposition:     I recommended COVID-19 PCR testing on or after day 5 since last exposure and if negative can end quarantine after 7 days  Patient was instructed to watch for symptoms until 14 days after exposure  If patient were to develop symptoms, they should immediately self isolate and call our office for further guidance  I referred patient to one of our centralized sites for a COVID-19 swab  I have spent 15 minutes directly with the patient  Encounter provider Porfirio Ballard MD    Provider located at [address] [address]    Recent Visits  No visits were found meeting these conditions  Showing recent visits within past 7 days and meeting all other requirements     Today's Visits  Date Type Provider Dept   04/21/21 Telemedicine Porfirio Ballard, [address] [place] [address] today's visits and meeting all other requirements     Future Appointments  No visits were found meeting these conditions  Showing future appointments within next 150 days and meeting all other requirements        Patient agrees to participate in a virtual check in via telephone or video visit instead of presenting to the office to address urgent/immediate medical needs  Patient is aware this is a billable service  After connecting through Telephone, the patient was identified by name and date of birth  Bekah Salas was informed that this was a telemedicine visit and that the exam was being conducted confidentially over secure lines  My office door was closed  No one else was in the room   Bekah Salas acknowledged consent and understanding of privacy and security of the telemedicine visit  I informed the patient that I have reviewed her record in Epic and presented the opportunity for her to ask any questions regarding the visit today  The patient agreed to participate  It was my intent to perform this visit via video technology but the patient was not able to do a video connection so the visit was completed via audio telephone only  Subjective:   Virgilio Olmedo is a 19 y o  female who is concerned about COVID-19  Patient's symptoms include nasal congestion, sore throat and headache  Patient denies fever, chills, fatigue, malaise, rhinorrhea, anosmia, loss of taste, cough, shortness of breath, chest tightness, abdominal pain, nausea, vomiting, diarrhea and myalgias  Date of symptom onset: 4/14/2021  Date of exposure: 4/18/2021    Exposure:   Contact with a person who is under investigation (PUI) for or who is positive for COVID-19 within the last 14 days?: Yes    Hospitalized recently for fever and/or lower respiratory symptoms?: No      Currently a healthcare worker that is involved in direct patient care?: No      Works in a special setting where the risk of COVID-19 transmission may be high? (this may include long-term care, correctional and half-way facilities; homeless shelters; assisted-living facilities and group homes ): No      Resident in a special setting where the risk of COVID-19 transmission may be high? (this may include long-term care, correctional and half-way facilities; homeless shelters; assisted-living facilities and group homes ): No      Lab Results   Component Value Date    SARSCOV2 Negative 08/07/2020     Past Medical History:   Diagnosis Date    Murmur, cardiac     Psychiatric disorder      No past surgical history on file    Current Outpatient Medications   Medication Sig Dispense Refill    ARIPiprazole (ABILIFY) 10 mg tablet Take 1 tablet (10 mg total) by mouth daily 30 tablet 0    Cyanocobalamin (VITAMIN B 12 PO) Take by mouth daily      Multiple Vitamins-Minerals (multivitamin with minerals) tablet Take 1 tablet by mouth daily      NON FORMULARY Medical Marijuana      traZODone (DESYREL) 100 mg tablet Take 1 tablet (100 mg total) by mouth daily 30 tablet 0     No current facility-administered medications for this visit  No Known Allergies    Review of Systems   Constitutional: Negative for chills, fatigue and fever  HENT: Positive for congestion and sore throat  Negative for rhinorrhea  Respiratory: Negative for cough, chest tightness and shortness of breath  Gastrointestinal: Negative for abdominal pain, diarrhea, nausea and vomiting  Musculoskeletal: Negative for myalgias  Neurological: Positive for headaches  Objective: There were no vitals filed for this visit  Physical Exam   It was my intent to perform this visit via video technology but the patient was not able to do a video connection so the visit was completed via audio telephone only  VIRTUAL VISIT DISCLAIMER    Eloise Strongs acknowledges that she has consented to an online visit or consultation  She understands that the online visit is based solely on information provided by her, and that, in the absence of a face-to-face physical evaluation by the physician, the diagnosis she receives is both limited and provisional in terms of accuracy and completeness  This is not intended to replace a full medical face-to-face evaluation by the physician  Eloise Alfaro understands and accepts these terms

## 2021-04-23 DIAGNOSIS — Z20.822 EXPOSURE TO COVID-19 VIRUS: ICD-10-CM

## 2021-04-23 PROCEDURE — U0005 INFEC AGEN DETEC AMPLI PROBE: HCPCS | Performed by: FAMILY MEDICINE

## 2021-04-23 PROCEDURE — U0003 INFECTIOUS AGENT DETECTION BY NUCLEIC ACID (DNA OR RNA); SEVERE ACUTE RESPIRATORY SYNDROME CORONAVIRUS 2 (SARS-COV-2) (CORONAVIRUS DISEASE [COVID-19]), AMPLIFIED PROBE TECHNIQUE, MAKING USE OF HIGH THROUGHPUT TECHNOLOGIES AS DESCRIBED BY CMS-2020-01-R: HCPCS | Performed by: FAMILY MEDICINE

## 2021-04-25 LAB — SARS-COV-2 RNA RESP QL NAA+PROBE: NEGATIVE

## 2023-12-26 NOTE — ED NOTES
Patient sitting in chair in common area, did not want to take medications did spit some out, patient visibly upset and shaking, wants someone to talk to who will understand her, did write mental health matters and how she feels on paper, requested I read it while in room, doctor silverio Vera, RN  08/09/20 0013 Oriented - self; Oriented - place; Oriented - time